# Patient Record
Sex: FEMALE | Race: WHITE
[De-identification: names, ages, dates, MRNs, and addresses within clinical notes are randomized per-mention and may not be internally consistent; named-entity substitution may affect disease eponyms.]

---

## 2017-01-12 ENCOUNTER — HOSPITAL ENCOUNTER (EMERGENCY)
Dept: HOSPITAL 68 - ERH | Age: 47
End: 2017-01-12
Payer: COMMERCIAL

## 2017-01-12 VITALS — SYSTOLIC BLOOD PRESSURE: 157 MMHG | DIASTOLIC BLOOD PRESSURE: 76 MMHG

## 2017-01-12 DIAGNOSIS — K57.90: Primary | ICD-10-CM

## 2017-01-12 DIAGNOSIS — N20.0: ICD-10-CM

## 2017-01-12 DIAGNOSIS — D18.00: ICD-10-CM

## 2017-01-12 DIAGNOSIS — N28.1: ICD-10-CM

## 2017-01-12 LAB
ABSOLUTE GRANULOCYTE CT: 8.2 /CUMM (ref 1.4–6.5)
BASOPHILS # BLD: 0 /CUMM (ref 0–0.2)
BASOPHILS NFR BLD: 0.2 % (ref 0–2)
EOSINOPHIL # BLD: 0.1 /CUMM (ref 0–0.7)
EOSINOPHIL NFR BLD: 0.9 % (ref 0–5)
ERYTHROCYTE [DISTWIDTH] IN BLOOD BY AUTOMATED COUNT: 14.5 % (ref 11.5–14.5)
GRANULOCYTES NFR BLD: 86.6 % (ref 42.2–75.2)
HCT VFR BLD CALC: 43.3 % (ref 37–47)
LYMPHOCYTES # BLD: 0.9 /CUMM (ref 1.2–3.4)
MCH RBC QN AUTO: 30 PG (ref 27–31)
MCHC RBC AUTO-ENTMCNC: 33.6 G/DL (ref 33–37)
MCV RBC AUTO: 89.2 FL (ref 81–99)
MONOCYTES # BLD: 0.3 /CUMM (ref 0.1–0.6)
PLATELET # BLD: 241 /CUMM (ref 130–400)
PMV BLD AUTO: 8.5 FL (ref 7.4–10.4)
RED BLOOD CELL CT: 4.85 /CUMM (ref 4.2–5.4)
WBC # BLD AUTO: 9.5 /CUMM (ref 4.8–10.8)

## 2017-01-12 NOTE — ED GI/GU/ABDOMINAL COMPLAINT
History of Present Illness
 
General
Chief Complaint: Abdominal Pain/Flank Pain
Stated Complaint: R LOWER ABD PAIN X 4 HOURS
Source: patient, old records
Exam Limitations: no limitations
 
Vital Signs & Intake/Output
Vital Signs & Intake/Output
 Vital Signs
 
 
Date Time Temp Pulse Resp B/P Pulse O2 O2 Flow FiO2
 
     Ox Delivery Rate 
 
 1630 98.8 88 18 157/76 96   
 
 1507     98 Room Air  
 
 1355 97.9 87 18 181/130 99 Room Air  
 
 
Allergies
Coded Allergies:
No Known Allergies (10/25/16)
 
Reconcile Medications
Dicyclomine Hydrochloride (Bentyl) 10 MG CAPSULE   1 CAP PO TID PRN PAIN
Ondansetron (Zofran Odt) 4 MG TAB.RAPDIS   1 TAB SL TID PRN NAUSEA
Oxycodone HCl/Acetaminophen (Percocet 5-325 MG Tablet) 5 MG-325 MG TABLET   1 
TAB PO TID PAIN
 
Triage Note:
C/O RIGHT SIDE ABDOMINAL PAIN X4 HOURS. +NAUSEA,
 +VOMITING X3.  INCREASED PAIN WITH WALKING.
Triage Nurses Notes Reviewed? yes
Pregnant? n
Is pt currently breastfeeding? No
Onset: Abrupt
Duration: hour(s): (4), constant
Timing: recent history
Quality/Severity: mild, moderate, sharpness
Severity Numbers: 6
Location: right lower quadrant
Radiation: no radiation
Activities at Onset: none
Prior Abdominal Problems: none
No Modifying Factors: none
Associated Symptoms: denies
HPI:
46-year-old female with history of hypertension cholecystectomy presents 
complaining of a 4 hour onset right lower quadrant abdominal pain that she awoke
him sleep with associated multiple episodes of nausea vomiting and diarrhea.  
She reports little sick contacts at home with similar symptoms however states 
that they did not have this abdominal pain.  The pain is worse with movement and
better at rest she attempted taking ibuprofen without improvement.  She denies 
any black or bloody stools no hematemesis no fever no chills no urinary 
complaints.  Her last menstrual cycle was a few weeks ago normal she denies any 
vaginal bleeding or discharge
 
Past History
 
Travel History
Traveled to [place] past 21 day No
 
Medical History
Any Pertinent Medical History? see below for history
Cardiovascular: hypertension
 
Surgical History
Surgical History: none
 
Psychosocial History
What is your primary language English
Tobacco Use: Never used
ETOH Use: occasional use
Illicit Drug Use: denies illicit drug use
 
Family History
Hx Contributory? No
 
Review of Systems
 
Review of Systems
Constitutional:
Reports: see HPI. 
All Other Systems: Reviewed and Negative
Comments
Review of systems: See HPI, All other systems negative.
Constitutional, no chills no fever, no malaise 
HEENT: no sore throat no congestion
Cardiovascular: No chest pain , no palpitation 
Skin, no jaundice no rashes, no change in skin
Respiratory: No dyspnea no cough no  sputum 
GI: No nausea no vomiting, no diarrhea, no bloating/constipation
: No dysuria No hematuria, no frequency, no discharge
Muscle skeletal: No joint pain, no back pain, no neck pain,
Neurologic: No numbness  no headache
Psych: No stress 
Heme/endocrine: No bruising no bleeding 
Immunology: No lymphadenopathy
 
Physical Exam
 
Physical Exam
General Appearance: well developed/nourished, alert, awake
Gastrointestinal: soft
Comments:
Well-developed well-nourished person in no acute distress
HEENT: Normal EENT exam; PERRL, EOMI,  HEAD is atraumatic. moist mucous 
membranes.  
Neck: Supple,  normal range of motion
Back: Nontender, no CVA tenderness. Full range of motion
Cardiovascular: Regular rate and rhythms no murmurs rubs 
Respiratory: No respiratory distress.  Patient speaking in full complete 
sentences. Breath sounds clear to auscultation bilaterally: NO W/R/R
Abdomen: Soft, tender to palpation of the right lower quadrant negative Rovsing'
s negative  sign nondistended, no appreciable organomegaly. Normal bowel
sounds. No rebound/guarding, 
Extremity: No edema, full range of motion of extremities,
Neuro: Alert oriented x3, motor sensory normal, There were no obvious focal 
neurologic abnormalities.
Skin: No appreciable rash on exposed skin, skin is warm and dry.
Psych: Mood and affect is normal, memory and judgment is normal.
 
Core Measures
ACS in differential dx? No
Severe Sepsis Present: No
Septic Shock Present: No
 
Progress
Differential Diagnosis: appendicitis, bowel obstruction, colon cancer, ectopic 
pregnancy, hepatitis, inflamm bowel dis, intrauterine pregnancy, kidney stone, 
ovarian cyst, ovarian torsion, pancreatitis, peptic ulcer, PUD/GERD, perforated 
viscous, SBO, threatened AB, UTI/pyelo
Plan of Care:
 Orders
 
 
Procedure Date/time Status
 
Add-on Test (ER Only)  1540 Active
 
HUMAN BETA HCG SCREEN  1433 Complete
 
URINALYSIS  1410 Complete
 
COMPREHENSIVE METABOLIC PANEL  1410 Complete
 
CBC WITHOUT DIFFERENTIAL  1410 Complete
 
 
 Current Medications
 
 
  Sig/[place]gett Start time  Last
 
Medication Dose  Stop Time Status Admin
 
Morphine Sulfate 2 MG ONCE ONE  1645 CAN 
 
(Morphine)    1646  
 
 
 Laboratory Tests
 
 
 
17 1519:
Urine Color YEL, Urine Clarity CLEAR, Urine pH 6.0, Ur Specific Gravity >= 1.030
, Urine Protein 30  H, Urine Ketones NEG, Urine Nitrite NEG, Urine Bilirubin NEG
, Urine Urobilinogen 0.2, Ur Leukocyte Esterase NEG, Ur Microscopic SEDIMENT 
EXAMINED, Urine RBC RARE, Urine WBC RARE, Ur Epithelial Cells MOD  H, Urine 
Mucus MOD  H, Urine Hemoglobin NEG, Urine Glucose NEG
 
17 1433:
Anion Gap 15, Estimated GFR > 60, BUN/Creatinine Ratio 28.6  H, Glucose 104  H, 
Calcium 9.4, Total Bilirubin 0.8, AST 20, ALT 35, Alkaline Phosphatase 100, 
Total Protein 8.1, Albumin 4.4, Globulin 3.7, Albumin/Globulin Ratio 1.2, Total 
Beta HCG NEGATIVE, CBC w Diff NO MAN DIFF REQ, RBC 4.85, MCV 89.2, MCH 30.0, RDW
14.5, MPV 8.5, Gran % 86.6  H, Lymphocytes % 9.0  L, Monocytes % 3.3, 
Eosinophils % 0.9, Basophils % 0.2, Absolute Granulocytes 8.2  H, Absolute 
Lymphocytes 0.9  L, Absolute Monocytes 0.3, Absolute Eosinophils 0.1, Absolute 
Basophils 0, PUBS MCHC 33.6
Labs ordered old records reviewed patient medicated Dilaudid 1 mg IV IV fluids 
CAT scan ordered discussed with her all of her lab results to date.
 
 
2017 5:03:00 PM discussed with the patient leave again all of her lab 
results CAT scan findings and incidental findings on ct.  The patient reports 
her pain is resolved with the 1 mg of Dilaudid IV fluids are running she is 
requesting something to drink.
 
Patient is tolerating by mouth challenge discussed the need for bland diet clear
liquids advance as tolerated.  Prescription for Bentyl Percocet and Zofran were 
provided advised close follow-up with her primary care tomorrow return anytime 
sooner if she feels worsening or symptoms or any other concerns.  I answer all 
of her questions.
I discussed with the patient at length all of their results, need for close 
follow up with their primary care physician.  This week.  I answered all of 
their questions, they feel comfortable with the plan and follow-up care. 
 
 
I discussed the medications that they will receive with the patient. I gave them
signs and symptoms that could indicate an adverse reaction. I have advised them 
to limit their activities until they can see how they respond to the medication.
 
(SARAH ARENAS,REN)
Diagnostic Imaging:
Viewed by Me: CT Scan.  Discussed w/RAD: CT Scan. 
Radiology Impression: PATIENT: ROMINA SLAUGHTER  MEDICAL RECORD NO: [id number] PRESENT
AGE: 46  PATIENT ACCOUNT NO: [id number] : 69  LOCATION: [facility] ORDERING 
PHYSICIAN: REN ARENAS     SERVICE DATE: 1128 EXAM TYPE: CAT - CT 
ABD & PELVIS W IV CONTRAST EXAMINATION: CT ABDOMEN AND PELVIS WITH CONTRAST 
CLINICAL INFORMATION: Right lower quadrant pain with nausea, vomiting and 
diarrhea.  Evaluate for appendicitis. COMPARISON: Abdominal ultrasound 2016 TECHNIQUE: Multidetector volumetric imaging was performed of the abdomen 
and pelvis before and after the IV administration of 95 mL of Optiray 320 
intravenous contrast. Coronal and sagittal reformatted images were provided. DLP
: 1634.67 mGy-cm. FINDINGS: LUNG BASES: The visualized lung bases are clear. 
LIVER, GALLBLADDER, AND BILIARY TREE: The liver is normal in size, shape, and 
attenuation. There is a 1.8 cm hypodensity within the left hepatic lobe (image 
23, series 2) which is indeterminate. It is grossly unchanged in size and 
compared to the prior ultrasound. No other hepatic lesions are identified. No 
biliary ductal dilatation is present. The patient is status post 
cholecystectomy. PANCREAS: Unremarkable. SPLEEN: Unremarkable. ADRENAL GLANDS: 
Unremarkable. KIDNEYS AND URETERS: The kidneys are normal in size, shape, and 
attenuation. A subcentimeter hypodensity is noted within the right upper pole, 
too small to characterize. There is a 6 mm nonobstructing calculus within the 
right upper pole (image 39, series 2). No ureteral calculi identified. A 1.2 cm 
hypoattenuating lesion is seen within the anterior left mid to lower pole. This 
does not demonstrate attenuation values of simple fluid. The lesion is 
indeterminate but may represent hyperdense cyst. Further evaluation can be 
obtained with ultrasound. There is a 4 mm nonobstructing calculus within the 
left upper pole (image 36, series 2). No ureteral calculi are identified. No 
hydronephrosis or hydroureter. No significant perinephric stranding. BLADDER: 
Underdistended. GASTROINTESTINAL TRACT: There is no bowel obstruction, free 
intraperitoneal air or fluid. What appears to be the appendix (image 58, series 
602) is within normal limits. No inflammatory changes are identified within the 
right lower quadrant. Nondilated fluid-filled loops of small bowel are noted 
throughout the abdomen without associated bowel wall thickening. There is 
descending and sigmoid colon diverticulosis without CT evidence of acute 
diverticulitis. ABDOMINAL WALL: No significant hernia is appreciated. LYMPH 
NODES: No mesenteric, retroperitoneal or pelvic lymphadenopathy. VASCULAR: 
Nonaneurysmal abdominal aorta. Patent portal and renal veins. PELVIC VISCERA: 
Uterus is retroverted. Probable small right adnexal cyst measuring 2.7 x 1.5 cm 
(image 82, series 2). OSSEOUS STRUCTURES: There are no aggressive osseous 
lesions. Mild degenerative changes are seen within the spine and sacroiliac 
joints. IMPRESSION: 1. No acute intra-abdominal or pelvic abnormality. 2. 
Nonobstructing bilateral renal calculi. 3. Diverticulosis without CT evidence of
acute diverticulitis. 4. Indeterminate hypodensity within the left hepatic lobe 
is grossly stable in size compared to the prior ultrasound. The ultrasound 
imaging suggests that this lesion may represent hemangioma. Confirmation could 
be obtained with dedicated hepatic mass protocol MRI. 5. Indeterminate left 
renal hypodensity, likely representing a cyst. Further evaluation can be 
obtained with renal ultrasound. 6. Other findings as described. DICTATED BY: 
LESVIA BUSTILLO DO  DATE/TIME DICTATED:17 :RAD.WATERS
 DATE/TIME TRANSCRIBED:17 CONFIDENTIAL, DO NOT COPY WITHOUT 
APPROPRIATE AUTHORIZATION.  <Electronically signed in Other Vendor System>      
                                                                                
SIGNED BY: LESVIA BUSTILLO DO 17 1653
Initial ED EKG: none
 
Departure
 
Departure
Time of Disposition: 1706
Disposition: HOME OR SELF CARE
Condition: Stable
Clinical Impression
Primary Impression: Abdominal pain
Secondary Impressions: Diverticulosis, Kidney stone, Liver hemangioma, Renal 
cyst
Referrals:
DESIRAE GRUBBS MD (PCP/Family)
 
Additional Instructions:
[place] diet clear liquids advance diet as tolerated.  Ondansetron for nausea.  
Bentyl for abdominal pain.  Percocet for breakthrough pain.  Use caution as this
is a narcotic and may make you drowsy no driving or drinking alcohol while 
taking.  Follow-up with her primary care physician tomorrow.  Return immediately
if he had worsening of your symptoms fever chills or any other concerns. These  
Prescriptions were sent to your pharmacy
Departure Forms:
Customer Survey
General Discharge Information
Prescriptions:
Current Visit Scripts
Oxycodone HCl/Acetaminophen (Percocet 5-325 MG Tablet) 1 TAB PO TID 
     #10 TAB 
 
Dicyclomine Hydrochloride (Bentyl) 1 CAP PO TID PRN PAIN
     #12 CAP 
 
Ondansetron (Zofran Odt) 1 TAB SL TID PRN NAUSEA
     #10 TAB 
 
 
 
ED Attending Observation
Initial Observation Note:
I have seen and personally examined ROMINA SLAUGHTER 
on 17 at 1714. 
 
I agree with the current emergency department documentation.  The disposition 
(admission or discharge) is uncertain at this time, she needs a period of 
observation for the following reason(s): 
 
The ED Nurse caring for this patient has been personally informed as to what the
patient is being observed for.

## 2017-01-12 NOTE — CT SCAN REPORT
EXAMINATION:
CT ABDOMEN AND PELVIS WITH CONTRAST
 
CLINICAL INFORMATION:
Right lower quadrant pain with nausea, vomiting and diarrhea.  Evaluate for
appendicitis.
 
COMPARISON:
Abdominal ultrasound 04/25/2016
 
TECHNIQUE:
Multidetector volumetric imaging was performed of the abdomen and pelvis
before and after the IV administration of 95 mL of Optiray 320 intravenous
contrast. Coronal and sagittal reformatted images were provided.
 
DLP:
1634.67 mGy-cm.
 
FINDINGS:
 
LUNG BASES: The visualized lung bases are clear.
 
LIVER, GALLBLADDER, AND BILIARY TREE: The liver is normal in size, shape, and
attenuation. There is a 1.8 cm hypodensity within the left hepatic lobe
(image 23, series 2) which is indeterminate. It is grossly unchanged in size
and compared to the prior ultrasound. No other hepatic lesions are
identified. No biliary ductal dilatation is present. The patient is status
post cholecystectomy.
 
PANCREAS: Unremarkable.
 
SPLEEN: Unremarkable.
 
ADRENAL GLANDS: Unremarkable.
 
KIDNEYS AND URETERS: The kidneys are normal in size, shape, and attenuation.
A subcentimeter hypodensity is noted within the right upper pole, too small
to characterize. There is a 6 mm nonobstructing calculus within the right
upper pole (image 39, series 2). No ureteral calculi identified. A 1.2 cm
hypoattenuating lesion is seen within the anterior left mid to lower pole.
This does not demonstrate attenuation values of simple fluid. The lesion is
indeterminate but may represent hyperdense cyst. Further evaluation can be
obtained with ultrasound. There is a 4 mm nonobstructing calculus within the
left upper pole (image 36, series 2). No ureteral calculi are identified. No
hydronephrosis or hydroureter. No significant perinephric stranding.
 
BLADDER: Underdistended.
 
GASTROINTESTINAL TRACT: There is no bowel obstruction, free intraperitoneal
air or fluid. What appears to be the appendix (image 58, series 602) is
within normal limits. No inflammatory changes are identified within the right
lower quadrant. Nondilated fluid-filled loops of small bowel are noted
throughout the abdomen without associated bowel wall thickening. There is
descending and sigmoid colon diverticulosis without CT evidence of acute
diverticulitis.
 
ABDOMINAL WALL: No significant hernia is appreciated.
 
LYMPH NODES: No mesenteric, retroperitoneal or pelvic lymphadenopathy.
 
VASCULAR: Nonaneurysmal abdominal aorta. Patent portal and renal veins.
 
PELVIC VISCERA: Uterus is retroverted. Probable small right adnexal cyst
measuring 2.7 x 1.5 cm (image 82, series 2).
 
OSSEOUS STRUCTURES: There are no aggressive osseous lesions. Mild
degenerative changes are seen within the spine and sacroiliac joints.
 
IMPRESSION:
1. No acute intra-abdominal or pelvic abnormality.
2. Nonobstructing bilateral renal calculi.
3. Diverticulosis without CT evidence of acute diverticulitis.
4. Indeterminate hypodensity within the left hepatic lobe is grossly stable
in size compared to the prior ultrasound. The ultrasound imaging suggests
that this lesion may represent hemangioma. Confirmation could be obtained
with dedicated hepatic mass protocol MRI.
5. Indeterminate left renal hypodensity, likely representing a cyst. Further
evaluation can be obtained with renal ultrasound.
6. Other findings as described.

## 2017-01-22 ENCOUNTER — HOSPITAL ENCOUNTER (EMERGENCY)
Dept: HOSPITAL 68 - ERH | Age: 47
End: 2017-01-22
Payer: COMMERCIAL

## 2017-01-22 VITALS — DIASTOLIC BLOOD PRESSURE: 57 MMHG | SYSTOLIC BLOOD PRESSURE: 116 MMHG

## 2017-01-22 VITALS — WEIGHT: 293 LBS | BODY MASS INDEX: 45.99 KG/M2 | HEIGHT: 67 IN

## 2017-01-22 DIAGNOSIS — R11.2: Primary | ICD-10-CM

## 2017-01-22 DIAGNOSIS — R10.30: ICD-10-CM

## 2017-01-22 LAB
ABSOLUTE GRANULOCYTE CT: 11.2 /CUMM (ref 1.4–6.5)
BASOPHILS # BLD: 0.1 /CUMM (ref 0–0.2)
BASOPHILS NFR BLD: 0.5 % (ref 0–2)
EOSINOPHIL # BLD: 0 /CUMM (ref 0–0.7)
EOSINOPHIL NFR BLD: 0.2 % (ref 0–5)
ERYTHROCYTE [DISTWIDTH] IN BLOOD BY AUTOMATED COUNT: 14.4 % (ref 11.5–14.5)
GRANULOCYTES NFR BLD: 83 % (ref 42.2–75.2)
HCT VFR BLD CALC: 42.6 % (ref 37–47)
LYMPHOCYTES # BLD: 1.7 /CUMM (ref 1.2–3.4)
MCH RBC QN AUTO: 30.1 PG (ref 27–31)
MCHC RBC AUTO-ENTMCNC: 34 G/DL (ref 33–37)
MCV RBC AUTO: 88.6 FL (ref 81–99)
MONOCYTES # BLD: 0.5 /CUMM (ref 0.1–0.6)
PLATELET # BLD: 284 /CUMM (ref 130–400)
PMV BLD AUTO: 8.7 FL (ref 7.4–10.4)
RED BLOOD CELL CT: 4.81 /CUMM (ref 4.2–5.4)
WBC # BLD AUTO: 13.5 /CUMM (ref 4.8–10.8)

## 2017-01-22 NOTE — ED GI/GU/ABDOMINAL COMPLAINT
History of Present Illness
 
General
Chief Complaint: Abdominal Pain/Flank Pain
Stated Complaint: PT C/O ABD PAIN. N/V/D SEEN HERE LAST WK FOR SAME
Source: patient, family, old records
Exam Limitations: no limitations
 
Vital Signs & Intake/Output
Vital Signs & Intake/Output
 Vital Signs
 
 
Date Time Temp Pulse Resp B/P Pulse O2 O2 Flow FiO2
 
     Ox Delivery Rate 
 
01/22 1112 97.6 79 20 116/57 99 Room Air  
 
01/22 0800 97.5 70 20 122/80 99 Room Air  
 
01/22 0611 97.2 66 18 181/91 100 Room Air  
 
 
Allergies
Coded Allergies:
No Known Allergies (10/25/16)
 
Reconcile Medications
Dicyclomine Hydrochloride (Bentyl) 10 MG CAPSULE   1 CAP PO TID PRN PAIN
Hydrochlorothiazide 25 MG TABLET   25 MG PO DAILY HIGH BLOOD PRESSURE  (Reported
)
Hyoscyamine Sulfate (Levsin-Sl) 0.125 MG TAB.SUBL   1-2 TAB SL Q4P PRN abdominal
pain
Ondansetron (Zofran Odt) 4 MG TAB.RAPDIS   1 TAB SL TID PRN NAUSEA
Ondansetron (Zofran Odt) 4 MG TAB.RAPDIS   1 TAB SL TID PRN nausea
Oxycodone HCl/Acetaminophen (Percocet 5-325 MG Tablet) 5 MG-325 MG TABLET   1 
TAB PO TID PAIN
Tramadol HCl (Ultram) 50 MG TABLET   1-2 TAB PO Q6PRN PRN severe pain
 
Triage Note:
PT C/O PERIUMBILICAL PAIN THAT RADIATES DOWN,
 N/V, DIZZINESS AND CHILLS SINCE WEDNESDAY. PT
 REPORTS BEING SEEN HERE FOR THE SAME MORE THEN A
 WEEK AGO. ABDOMEN SOFT AND TENDER UPON PALPATION.
Triage Nurses Notes Reviewed? yes
LMP (ages 10-50): unknown
Pregnant? n
Is pt currently breastfeeding? No
Onset: 2 days
Duration: day(s):, continues in ED, intermittent
Timing: recent history
Quality/Severity: aching, cramping, severe, vomiting
Location: suprapubic
Radiation: back
Activities at Onset: none
Prior Abdominal Problems: similar symptoms
Past Sexual History: Unobtainable at this time
Modifying Factors:
Improves With: rest.  Worsens With: eating, palpation. 
Associated Symptoms: abdominal pain, loss of appetite, nausea/vomiting
HPI:
2 days prior to admission patient was exposed to daughter with nausea vomiting 
diarrhea that she eventually developed accompanied by intermittent sharp 
moderate to severe suprapubic pain worse with eating palpation.
She denies fever chills chest pain cough shortness of breath headache dysuria 
rash bleeding.
 
Past History
 
Travel History
Traveled to [place] past 21 day No
 
Medical History
Any Pertinent Medical History? see below for history
Neurological: NONE
EENT: NONE
Cardiovascular: hypertension
Respiratory: NONE
Gastrointestinal: NONE
Hepatic: NONE
Renal: NONE
Musculoskeletal: NONE
Psychiatric: NONE
Endocrine: NONE
 
Surgical History
Surgical History: none
 
Psychosocial History
What is your primary language English
Tobacco Use: Never used
 
Family History
Hx Contributory? No
 
Review of Systems
 
Review of Systems
Constitutional:
Reports: see HPI, malaise. 
EENTM:
Reports: no symptoms. 
Respiratory:
Reports: no symptoms. 
Cardiovascular:
Reports: no symptoms. 
GI:
Reports: see HPI, abdominal pain, nausea, vomiting. 
Genitourinary:
Reports: no symptoms. 
Musculoskeletal:
Reports: no symptoms. 
Skin:
Reports: no symptoms. 
Neurological/Psychological:
Reports: no symptoms. 
Hematologic/Endocrine:
Reports: no symptoms. 
Immunologic/Allergic:
Reports: no symptoms. 
All Other Systems: Reviewed and Negative
 
Physical Exam
 
Physical Exam
General Appearance: well developed/nourished, alert, awake, anxious, moderate 
distress, obese
Head: atraumatic, normal appearance
Eyes:
Bilateral: normal appearance, PERRL, EOMI, normal inspection. 
Ears, Nose, Throat, Mouth: hearing grossly normal, dry mucous membranes
Neck: normal inspection, supple, full range of motion, normal alignment
Respiratory: normal breath sounds, chest non-tender, no respiratory distress, 
quiet respiration, lungs clear
Cardiovascular: regular rate/rhythm, normal peripheral pulses, norml femoral 
pulses equa
Peripheral Pulses:
4+ carotid (R), 4+ carotid (L)
Gastrointestinal: normal bowel sounds, soft, non-tender, no organomegaly
Back: normal inspection, normal range of motion
Extremities: normal range of motion, no ligament instability
Neurologic/Psych: no motor/sensory deficits, awake, alert, oriented x 3, normal 
gait, normal mood/affect, CNs II-XII nml as tested
Skin: intact, normal color, warm/dry
 
Core Measures
ACS in differential dx? No
Severe Sepsis Present: No
Septic Shock Present: No
 
Progress
Differential Diagnosis: appendicitis, biliary colic, diverticulitis, kidney 
stone, UTI/pyelo
Plan of Care:
 Orders
 
 
Procedure Date/time Status
 
URINE PREGNANCY 01/22 0841 Complete
 
URINALYSIS 01/22 0841 Complete
 
LIPASE 01/22 0841 Complete
 
COMPREHENSIVE METABOLIC PANEL 01/22 0841 Complete
 
CBC WITHOUT DIFFERENTIAL 01/22 0841 Complete
 
 
 Laboratory Tests
 
 
 
01/22/17 0903:
Urinalysis HEAVY  H, Urine Color YEL, Urine Clarity CLDY  H, Urine pH 5.5, Ur 
Specific Gravity >= 1.030, Urine Protein TRACE  H, Urine Ketones TRACE  H, Urine
Nitrite NEG, Urine Bilirubin NEG, Urine Urobilinogen 0.2, Ur Leukocyte Esterase 
NEG, Ur Microscopic SEDIMENT EXAMINED, Urine RBC RARE, Urine WBC RARE, Ur 
Epithelial Cells FEW, Urine Hemoglobin MOD  H, Urine Glucose NEG, Urine 
Pregnancy Test NEGATIVE
 
01/22/17 0852:
Anion Gap 13, Estimated GFR > 60, BUN/Creatinine Ratio 35.0  H, Glucose 150  H, 
Calcium 9.8, Total Bilirubin 0.8, AST 18, ALT 35, Alkaline Phosphatase 121, 
Total Protein 8.4  H, Albumin 4.6, Globulin 3.8, Albumin/Globulin Ratio 1.2, 
Lipase 101, CBC w Diff NO MAN DIFF REQ, RBC 4.81, MCV 88.6, MCH 30.1, RDW 14.4, 
MPV 8.7, Gran % 83.0  H, Lymphocytes % 12.3  L, Monocytes % 4.0, Eosinophils % 
0.2, Basophils % 0.5, Absolute Granulocytes 11.2  H, Absolute Lymphocytes 1.7, 
Absolute Monocytes 0.5, Absolute Eosinophils 0, Absolute Basophils 0.1, PUBS 
MCHC 34.0
Diagnostic Imaging:
Viewed by Me: Ultrasound.  Discussed w/RAD: Ultrasound. 
Radiology Impression: 1. Nonobstructing upper pole left renal calculus, similar 
to prior CT scan. 2. Mid right renal calcification seen on recent CT scan are 
not appreciated by ultrasound. 3. Mid left renal low-attenuation mass and upper 
pole right renal mass seen on CT scan are not appreciated on this ultrasound and
remain incompletely characterized. Depending on clinical circumstances, short 
interval (3-6 months) follow-up scan to document stability of these findings 
versus further characterization with dedicated renal MRI scan with and without 
contrast is recommended. 4. Duplicated left renal collecting system.
Initial ED EKG: none
 
Departure
 
Departure
Time of Disposition: 1314
Disposition: HOME OR SELF CARE
Condition: Stable
Clinical Impression
Primary Impression: Nausea and vomiting in adult
Secondary Impressions: 
Abdominal pain
Qualifiers:  Abdominal location: lower abdomen, unspecified Qualified Code: 
R10.30 - Lower abdominal pain, unspecified
 
Referrals:
HUMERA LOPEZ,DESIRAE (PCP/Family)
 
Additional Instructions:
Clear liquids in small amounts for 12-24 hours until better
Departure Forms:
Customer Survey
General Discharge Information
Prescriptions:
Current Visit Scripts
Ondansetron (Zofran Odt) 1 TAB SL TID PRN nausea
     #15 TAB 
 
Hyoscyamine Sulfate (Levsin-Sl) 1-2 TAB SL Q4P PRN abdominal pain
     #30 TAB 
 
Tramadol HCl (Ultram) 1-2 TAB PO Q6PRN PRN severe pain
     #30 TAB

## 2017-01-22 NOTE — ULTRASOUND REPORT
EXAMINATION:
US RETROPERITONEAL COMPLETE (RENAL)
 
CLINICAL INFORMATION:
Nausea and vomiting. Suprapubic and back pain. Presumptive diagnosis of renal
colic.
 
COMPARISON:
CT scan of the abdomen and pelvis dated 01/12/2017.
 
TECHNIQUE:
Real-time imaging of the kidneys and bladder.
 
FINDINGS:
 
RIGHT KIDNEY: 9.0 x 4.9 x 5.1 cm (SAG x AP x TRV). The kidney is normal in
size, contour, and echogenicity. The mid right renal calcification and the
upper pole right renal low-attenuation mass seen on recent CT scan is not
appreciated by ultrasound. Renal cortical thickness is normal. No focal
parenchymal lesions. No hydronephrosis.
 
LEFT KIDNEY: 12.6 x 5.5 x 5.3 cm (SAG x AP x TRV). The kidney is
asymmetrically larger compared to the right kidney given duplication of the
renal collecting system. Normal renal cortical echogenicity. Renal cortical
thickness is normal. As seen on the CT scan, there is a 0.5 x 0.3 x 0.5 cm cm
echogenic focus in the upper pole of the left kidney, consistent with a small
kidney stone. No focal parenchymal lesions. The mid left renal
low-attenuation mass seen on CT scan is not appreciated on this ultrasound.
No hydronephrosis.
 
BLADDER: Partially distended and unremarkable.
 
IMPRESSION:
 
1. Nonobstructing upper pole left renal calculus, similar to prior CT scan.
2. Mid right renal calcification seen on recent CT scan are not appreciated
by ultrasound.
3. Mid left renal low-attenuation mass and upper pole right renal mass seen
on CT scan are not appreciated on this ultrasound and remain incompletely
characterized. Depending on clinical circumstances, short interval (3-6
months) follow-up scan to document stability of these findings versus further
characterization with dedicated renal MRI scan with and without contrast is
recommended.
4. Duplicated left renal collecting system.

## 2017-02-22 ENCOUNTER — HOSPITAL ENCOUNTER (INPATIENT)
Dept: HOSPITAL 68 - 2NB | Age: 47
LOS: 2 days | DRG: 403 | End: 2017-02-24
Attending: SURGERY | Admitting: SURGERY
Payer: COMMERCIAL

## 2017-02-22 VITALS — DIASTOLIC BLOOD PRESSURE: 60 MMHG | SYSTOLIC BLOOD PRESSURE: 132 MMHG

## 2017-02-22 VITALS — HEIGHT: 67 IN | WEIGHT: 270 LBS | BODY MASS INDEX: 42.38 KG/M2

## 2017-02-22 VITALS — SYSTOLIC BLOOD PRESSURE: 132 MMHG | DIASTOLIC BLOOD PRESSURE: 60 MMHG

## 2017-02-22 VITALS — DIASTOLIC BLOOD PRESSURE: 64 MMHG | SYSTOLIC BLOOD PRESSURE: 160 MMHG

## 2017-02-22 VITALS — DIASTOLIC BLOOD PRESSURE: 80 MMHG | SYSTOLIC BLOOD PRESSURE: 170 MMHG

## 2017-02-22 DIAGNOSIS — E66.01: Primary | ICD-10-CM

## 2017-02-22 DIAGNOSIS — K44.9: ICD-10-CM

## 2017-02-22 DIAGNOSIS — I10: ICD-10-CM

## 2017-02-22 DIAGNOSIS — K21.9: ICD-10-CM

## 2017-02-22 PROCEDURE — 0BQS4ZZ: ICD-10-PCS | Performed by: SURGERY

## 2017-02-22 PROCEDURE — 0D164ZA BYPASS STOMACH TO JEJUNUM, PERCUTANEOUS ENDOSCOPIC APPROACH: ICD-10-PCS | Performed by: SURGERY

## 2017-02-22 PROCEDURE — 0BQR4ZZ: ICD-10-PCS | Performed by: SURGERY

## 2017-02-22 NOTE — ADMISSION CORE MEASURES
Admission Lab Results
I reviewed the following labs:
 Laboratory Tests
 
 
 02/22 0743
 
Chemistry 
 
  Total Beta HCG Pending
 
Urines 
 
  Urine Pregnancy Test Cancelled
 
 
 
 
Admission Meds
I reviewed the following Meds:
 Current Medications
 
 
  Sig/Bridgett Start time  Last
 
Medication Dose  Stop Time Status Admin
 
Cefazolin Sodium 3,000 MG ONCE 02/22 0000 NR 
 
(Kefzol-Ancef Inj)   02/22 2359  
 
Heparin Sodium  5,000 UNIT ONCE 02/22 0000 NR 
 
(Porcine)   02/22 2359  
 
 
 
 
Acute Coronary Syndrome
 
Inclusion Criteria
ACS Diagnosis No
 
Inpatient Core Measures
LDL Reminder: If No, please order W/I first 24hr of stay
 
Congestive Heart Failure
 
Inclusion Criteria
CHF Diagnosis No
 
Cerebrovascular accident
 
Inclusion Criteria
CVA/TIA Diagnosis No
 
Inpatient Core Measures
Bedside Swallow Eval Reminder: If BSE failed, place ST order
Antithrombotic Reminder: Order Antithrombotic Medication by end of day 2
Antithrombotic Reminder: Document Reason Antithrombotic Not ordered by end of 
day 2
AFIB/Flutter Reminder: If Present, add to problem list
AFIB/Flutter Reminder: Order Anticoag Medication for pts with AFIB/Flutter
Atherosclerosis Reminder: If Present, add to problem list
LDL Reminder: If No, please order W/I first 24hr of stay
PT Order Reminder: If No, please order
 
Venous thromboembolism
 
Inpatient Core Measures
VTE Risk Factors: Age > 40, Obesity, Surgery
VTE Prophylaxis Ordered Inpt Mech & Pharm
No Mech VTE prophylaxis d/t No contraindications
No VTE Pharm Prophylaxis d/t No contraindications
 
Inclusion Criteria
- Per Current guidelines, there needs to be overlap
- treatment for the first 5 days of Warfarin therapy.
- Parenteral Anticoagulation (IV or SC) needs to be
- given along with Warfarin therapy.
VTE Diagnosis No
VTE Type NONE
VTE Confirmed by (Test) NONE
 
Problem List
 
As ranked by this Provider
includes Assessment & Plan
 1. Gastric bypass status for obesity
 
 2. Morbid obesity
 
 3. GERD (gastroesophageal reflux disease)
 
 4. Hypertension
 
 
HOME MEDS
Home Med List
Hydrochlorothiazide 25 MG TABLET   25 MG PO DAILY HIGH BLOOD PRESSURE  (Reported
)
Pantoprazole Sodium (Protonix) 40 MG TABLET.DR   1 TAB PO DAILY GERD  (Reported)

## 2017-02-22 NOTE — PN- BARIATRICS
Subjective
Subjective:
Asking for ice chips. "I feel dry". No nausea. Not yet out of bed. Due to void 
by 1730. No dizziness. No shortness of breath. Reports incisional discomfort. 
 
Objective
Vital Signs and I&Os
Vital Signs
 
 
Date Time Temp Pulse Resp B/P Pulse O2 O2 Flow FiO2
 
     Ox Delivery Rate 
 
02/22 1427 97.7 73 16 132/60 96 Nasal 2.0L 
 
      Cannula  
 
 
 
Physical Exam:
General - alert & oriented x 3. comfortable. no acute distress.
Lungs - clear bilaterally. no w/r/r.
Cardiac - s1s2. reg.
Abdomen - dressings c/d/i. expected incisional tenderness. ALETA drain with scant 
bloody drainage.
Extremities - warm bilaterally. no c/c/e. calves soft and nontender b/l. 
athrombics b/l.
 
Assessment/Plan
Assessment/Plan
This 46 year old female with hx morbid obesity, htn, gerd, who is POD#0 s/p lap 
gastric bypass, hiatal hernia repair, JPx1
 
npo except ice chips until upper gi complete in morning
dilaudid / toradol / hycet prn pain control
hold hctz. monitor blood pressure
due to void later (1730)
barbra-operative ancefx2
protonix - gi ppx
hep sc - dvt ppx
lovenox teaching for home
oob/ambulation
f/u am labs
monitor ALETA drain
will d/w 
 
 
Core Measures/Miscellaneous
 
Venous Thromboembolism
VTE Risk Factors: Age > 40, Obesity, Surgery
VTE Contraindications: No Contraindications
VTE Prophylaxis Ordered Inpt: Mech & Pharm
VTE Diagnosis: No
VTE Type: NONE
VTE Confirmed by (Test): NONE
 
Beta Blocker
Is Beta Blocker a Home Med? No
 
Antibiotics
Is Patient on Antibiotics? Yes
If Yes: prophylaxis

## 2017-02-22 NOTE — NUR
NSG NOTE: PATIENT ARRIVED AT 1415 VIA BED ACCOMPANIED BY CASEY RN FROM PACU;
PATIENT ALERT/DROWSY/AROUSABLE AT THIS TIME; VSS (SEE INTERVENTION); 2L NC
(WILL TITRATE); IST PROVIDED BY THIS RN AND EDUCATED PATIENT; IVF RUNNING PER
ORDER; ALPS ON; PATIENT STATES PAIN 3/10; ORIENTED TO ROOM; CALL BELL IN
REACH; WILL CONT TO MONITOR

## 2017-02-22 NOTE — PATIENT DISCHARGE INSTRUCTIONS
Discharge Instructions
 
General Discharge Information
You were seen/treated for:
Morbid Obesity BMI 45, HTN, GERD, Hiatal Hernia
You had these procedures:
Laparoscopic Gastric Bypass, Laparoscopic Hiatal hernia repair
Watch for these problems:
fever>101.3, increased pain, redness/swelling/drainage
No bath, but you may shower: Yes
Other wound care:
dry guaze dressing change as needed over previous drain site. leave white steri 
strips in place.
Special Instructions:
continue lovenox injections daily, as directed
 
Diet
Continue normal diet: No
Recommended Diet: Bariatric
Additional DIET Information:
weekly bariatric stage diet advancement as tolerated, as directed
 
Activity
Full Activity/No Limits: No
Activity Self Limited: Yes
Pounds, do NOT lift more than: 10
Additional ACTIVITY Info:
no heavy lifting. no strenuous activity. walk frequently.
 
Acute Coronary Syndrome
 
Inclusion Criteria
At DC or during hospital stay patient has or had the following:
ACS DIAGNOSIS No
 
Discharge Core Measures
Meds if any: Prescribed or Continued at Discharge
Meds if any: NOT Prescribed or Continued at Discharge
 
Congestive Heart Failure
 
Inclusion Criteria
At DC or during hospital stay patient has or had the following:
CHF DIAGNOSIS No
 
Discharge Core Measures
Meds if any: Prescribed or Continued at Discharge
Meds if any: NOT Prescribed or Continued at Discharge
 
Cerebrovascular accident
 
Inclusion Criteria
At DC or during hospital stay patient has or had the following:
CVA/TIA Diagnosis No
 
Discharge Core Measures
Meds if any: Prescribed or Continued at Discharge
Meds if any: NOT Prescribed or Continued at Discharge
 
Venous thromboembolism
 
Inclusion Criteria
VTE Diagnosis No
VTE Type NONE
VTE Confirmed by (Test) NONE
 
Discharge Core Measures
- Per Current guidelines, there needs to be overlap
- treatment for the first 5 days of Warfarin therapy.
- If discharged on Warfarin prior to 5 days of
- overlap therapy, the patient will need to be
- assessed for post discharge needs including
- *Post discharge parental anticoagulation
- *Warfarin and/or parental anticoagulation education
- *Follow up date to check INR post discharge
At least 5 days overlap therapy as Inpatient No
Meds if any: Prescribed or Continued at Discharge
Note: Overlap Therapy is Warfarin and Anticoagulant
Meds if any: NOT Prescribed or Continued at Discharge

## 2017-02-22 NOTE — SURG SHORT-STAY <48HRS DIS SUM
Visit Information
 
Visit Dates
Admission Date:
02/22/17
 
Discharge Date:
2/24/17
 
 
Surgical Short Stay DC Summary
Admission Diagnosis:
Morbid Obesity BMI 45, HTN, GERD, Hiatal Hernia
Final Diagnosis:
same
 
Procedure(s):
Laparoscopic Gastric Bypass, Laparoscopic Hiatal hernia repair (2/22/17)
Summary/Significant Findings:
Electively schedule laparoscopic gastric bypass and hiatal hernia on 2/22/17. 
Routine post-operative course, including upper gi study on post-op day#1 morning
to r/o leak. Stage 1 bariatric diet initiated follow review of the upper gi 
study. ALETA drain monitored during hospitalization, and removed prior to her 
discharge to home. Her hydrochlorothiazide was held post-operatively and her 
blood pressure monitored. Lovenox teaching done for continued home prophylaxis (
prescription given pre-op). Discharge to home post-op day#2 after ALETA drain 
removed.
Condition at Discharge:
stable
Discharge Disposition: home or self care
Discharge instructions provided to patient/family: Yes
Post discharge follow-up plan:
call to schedule one week follow up visit if not previously scheduled
 
specific discharge instructions given to her at the time of her discharge to 
home

## 2017-02-22 NOTE — OPERATIVE REPORT
Operative/Inv Procedure Report
Surgery Date: 02/22/17
Name of Procedure:
Laparoscopic Gastric Bypass, Laparoscopic Hiatal hernia repair
Pre-Operative Diagnosis:
Morbid Obesity BMI 45, HTN, GERD, Hiatal Hernia
Post-Operative Diagnosis:
Same
Estimated Blood Loss: less than 50ml
Surgeon/Assistant:
MILVIA DOMINGUEZ DO
Anesthesia: general endotracheal tube
IV Fluids:
1800 cc
Drains:
10 Fr RUQ ALETA Drain
Specimens:
None
Complications:
none
Condition:
Stable
Operative Indication:
This is a 46-year-old female who presented to the office for workup for 
bariatric surgery.  After appropriate workup was completed we discussed with the
patient the band, the sleeve, and the gastric bypass.  The patient chose to 
undergo a gastric bypass.  All risks including but not limited to bleeding, 
infection, leak, stricture, injury to surrounding bowel/esophagus/stomach/liver/
spleen, marginal ulcer, malabsorption/malnutrition, dumping syndrome, internal 
hernia/small bowel obstruction, DVT/PE, and mortality of 1/1000 patients were 
discussed in detail.  The patient understood everything and decided to proceed.
 
Operative/Procedure Note
Note:
The patient was brought to the operating room and placed on the table in supine 
position.  Venodyne stockings were placed and adequate general endotracheal 
anesthesia was obtained.  The patient was prepped and draped in standard 
surgical fashion.  Began the procedure by making a 2 cm transverse incision 
supraumbilically and slightly to the left of the midline.  Then using a 12 mm 
clear Visiport and a 10 mm 0 laparoscope the abdominal cavity was accessed.  
Great care was taken to go through the anterior rectus sheath, the posterior 
rectus sheath, and through the peritoneum.  Once we entered the peritoneum the 
abdominal cavity was insufflated to 15 mmHg.  Upon initial examination no 
obvious gross pathology was seen.  Accessory trocars were placed, 5 mm in the 
epigastrium for the Sarah liver retractor.  The liver retractor was inserted
and the liver was retracted anteriorly exposing the hiatus, a hiatal hernia was 
seen.  5 mm ports were placed in the right and left upper quadrant, 5 mm left 
lateral port, and a 12 mm right lateral port.  Began the procedure by mobilizing
the angle of His and the left shira of the diaphragm.  The stomach was retracted 
towards the feet and the peritoneum was lysed until the left shira was clearly 
visualized.  A hernia sac was identified and was dissected away from the left 
shira exposing the hiatal hernia, there was a sliding component to it. We then 
brought our attention to the lesser curvature, and at the second vessel will 
began to accessing the retrogastric space.  Was done using Harmonic scalpel 
maintaining hemostasis.  Once the retrogastric space was accessed we began 
creating our pouch using 60 mm purple staple load 3 and 45 mm load x 1.  The 
pouch was created around an Franko tube.  Once the pouch was completely 
disconnected from the gastric remnant we examined the staple lines, some 
bleeding was noted and that was controlled using endoclips. Pars flaccida was 
then opened to the right shira and once again the hernia wac was identified and 
reduced. The esophagus was circumferentially dissected out of the chest until it
was in the abdominal cavity for 2-3 cm. The esophagus was retracted anteriorly 
and the hiatus was closed using 2-0 Ticron suture. At the completion it was 
adequately closed and there was ample room for the esophagus.  We then brought 
our attention to the small bowel, the omentum was retracted above the transverse
colon and the ligament of Treitz was identified.  The small bowel was run 50 cm 
and divided using 60 mm tan staple load.  2 clips were placed on the proximal 
staple line which was the biliopancreatic limb.  That limb was run to the 
ligament of Treitz to assure that that was the blind limb.  Following this the 
omentum was divided using Harmonic scalpel.  After the omentum was divided the 
small bowel was coming up to the gastric pouch with no tension.  At that point 
an enterotomy was made 5 cm from the prior divided distal staple line, snf 
between the mesenteric and antimesenteric part.  A gastrotomy was made anterior 
to the staple line.  A side to side gastrojejunostomy was created using 45 mm 
purple staple load approximately 3-3-1/2 cm in diameter.  The Franko tube was 
passed through the common enterotomy, and the common enterotomy was closed using
2-0 Vicryl suture in a running fashion double layer.  Following this the small 
bowel was clamped off distal to the anastomosis, and we preformed an air and a 
methylene blue leak test.  No obvious leak was noted.  All the methylene blue 
was suctioned out.  The small bowel was then run 120 cm and an enterotomy was 
made on the antimesenteric side.  Another enterotomy was made on the 
antimesenteric side of the biliopancreatic limb.  A side to side functional end 
end jejunojejunostomy was created using 60 mm tan staple load.  The common 
enterotomy was closed using a 60 mm tan staple load.  Some bleeding was noted 
from the staple line and that was controlled using endoclips as well.  The 
mesenteric defect was closed using 2-0 Tycron suture in a running fashion.  At 
that point we examined both anastomoses no obvious bleeding was noted and both 
appeared intact.  2-0 Vicryl was placed at the distal angle of the 
gastrojejunostomy to take some tension off the anastomosis.  A 10 Sudanese ALETA 
drain was placed through the right upper quadrant incision under the liver and 
over the spleen.  All ports were removed under direct visualization, no obvious 
bleeding was noted.  Skin was closed using 4-0 Monocryl.  Steri-Strips and 
dressings were placed.  The patient was successfully extubated and transferred 
to the recovery room in stable condition.  The patient tolerated the procedure 
well with no complications.
Findings:
3 cm sliding hiatal hernia, 120 cm alimentary limb
CC:
HUMERA LOPEZ,DESIRAE

## 2017-02-23 VITALS — DIASTOLIC BLOOD PRESSURE: 64 MMHG | SYSTOLIC BLOOD PRESSURE: 120 MMHG

## 2017-02-23 VITALS — SYSTOLIC BLOOD PRESSURE: 162 MMHG | DIASTOLIC BLOOD PRESSURE: 90 MMHG

## 2017-02-23 VITALS — DIASTOLIC BLOOD PRESSURE: 62 MMHG | SYSTOLIC BLOOD PRESSURE: 120 MMHG

## 2017-02-23 LAB
ABSOLUTE GRANULOCYTE CT: 7.6 /CUMM (ref 1.4–6.5)
BASOPHILS # BLD: 0 /CUMM (ref 0–0.2)
BASOPHILS NFR BLD: 0.1 % (ref 0–2)
EOSINOPHIL # BLD: 0 /CUMM (ref 0–0.7)
EOSINOPHIL NFR BLD: 0.1 % (ref 0–5)
ERYTHROCYTE [DISTWIDTH] IN BLOOD BY AUTOMATED COUNT: 13.6 % (ref 11.5–14.5)
GRANULOCYTES NFR BLD: 82.5 % (ref 42.2–75.2)
HCT VFR BLD CALC: 38 % (ref 37–47)
LYMPHOCYTES # BLD: 1 /CUMM (ref 1.2–3.4)
MCH RBC QN AUTO: 29.9 PG (ref 27–31)
MCHC RBC AUTO-ENTMCNC: 33.5 G/DL (ref 33–37)
MCV RBC AUTO: 89 FL (ref 81–99)
MONOCYTES # BLD: 0.6 /CUMM (ref 0.1–0.6)
PLATELET # BLD: 232 /CUMM (ref 130–400)
PMV BLD AUTO: 9.4 FL (ref 7.4–10.4)
RED BLOOD CELL CT: 4.27 /CUMM (ref 4.2–5.4)
WBC # BLD AUTO: 9.2 /CUMM (ref 4.8–10.8)

## 2017-02-23 NOTE — PN- STUDENT
CURT PARKER 02/23/17 0643:
Subjective
Subjective:
Patient states she did not have a good night. She reports overnight she 
experienced gas like pain across her lower abdomen in a band like distribution 
that at its worse is a 9/10. Patient reports she has a low pain tolerance. She 
further reported having an issue with the IV line and once it was switched she 
felt the abdominal pain was improved. She reports one episode of vomiting which 
she said had some blood in it that total might have been about the size of a 
quarter. She states her pain is well controlled at a 1-2 until just before the 4
hour jorge at which point it increases up to a 9. She states she does not like 
the liquid Vicodin so she would prefer to have pain until she is due for 
Dilauded. Patient also reports she has nausea, but the zofran is relieving it 
for most of the time. She denies any chest pain, shortness of breath, fever/
chills, headache, bowel movements, dysuria, knee or leg pain.    
 
Objective
Objective:
Vital signs:
23:50
BP: 160/64
Pulse: 69
Resp :18
Temp: 98.7F
SpO2: 96
 
 
Physical Exam:
General: well appearing, in no acute distress. alert and oriented to person 
place and time. 
Head: normocephalic, atraumatic
Eyes: PERRL, EOMI
Cardio: Regular rate and rhythm, clear S1 and S2. No murmurs, rubs or gallops. 
No JVD.
Pulmonary: Cleart to asucultation bilaterally. No rhonchi, wheeze, or rales. 
Abd: Tympanic to percussion, hypoactive bowel sounds. Tender to palpation at 
epigastric area and near incision sites. No ecchymosis, erythema or discharge. 
ALETA drain with minimal serosanguinious fluid. 
Extremities: Full range of motion, non-tender, no edema. 
 
  
 
Results
Results:
Laboratory Tests
 
02/22/17 0743:
Total Beta HCG NEGATIVE, Urine Pregnancy Test Cancelled
 
 
Assessment/Plan
Assessment:
Pt is a 46 year old female with a history of GERD, hypertension, morbid obeisity
, and hiatal hernia who is POD # 1 for a laproscopic gastric bypass and hiatal 
hernia repair with out a complicated hospital course.  
Plan:
-Continue NPO until GI study is complete.
-Pending adequate GI study, initiate step 1 diet. 
-Continue IV pain meds
-Initiate IV toradol for breakthrough pain. 
-Continue Zofran for nausea. 
-Encourage out of bed ambulation. 
-Initiate DVT ppx with lovenox and appropriate training.
-Follow up pending am labs. 
 
Will discuss the above with PA surgical team. 
 
Curt ARENAS-S2
 
 
 
ALEX ARENAS,DYLON 02/23/17 0713:
Objective
Objective:
Abdomen: Softly distended, obese, appropriate incisional tenderness, bowel 
sounds positive.  Port sites are clean, dry, and intact.  There is a ALETA 1 
holding suction with serous sinus drainage in the bulb.
Extremities: Bilateral lower extremities are warm without calf tenderness or 
significant edema.
 
Assessment/Plan
Plan:
Assessment: 46-year-old female status post laparoscopic gastric bypass with 
hiatal hernia repair postoperative day #1.  The patient is progressing as 
expected and her nausea is improving.
 
Plan as above, we'll add Toradol when necessary and encouraged patient to 
ambulate.  Advance to a stage I diet if upper GI series is okay.

## 2017-02-23 NOTE — RADIOLOGY REPORT
EXAMINATION:
FLUOROSCOPY UPPER GI WITH GASTROGRAFIN
 
CLINICAL INFORMATION:
1 day status post gastric bypass study. Postoperative evaluation.
 
COMPARISON:
The abdomen and pelvis dated 01/12/2017.
 
TECHNIQUE:
 
A limited Gastrografin upper GI study was performed using 30 ml of Gastroview
with the patient in the upright and supine position. Multiple spot films were
acquired.
 
FINDINGS:
The preliminary  view of the abdomen demonstrates a drain and
postsurgical staples in the epigastric region. Mild gaseous distention of
bowel loops is seen.
 
Evaluation of esophageal distensibility and motility is limited, but grossly
unremarkable. The GE junction is located below the level of the diaphragm and
no GE reflux seen.
 
The remnant gastric pouch is normal with no abnormal distention or contrast
leak seen.  There is prompt emptying of contrast into the anastamosed small
bowel, which is unremarkable in appearance.
 
FLUOROSCOPY TIME:
22 seconds.
 
IMPRESSION:
Unremarkable postoperative examination status post gastric bypass surgery. No
evidence of contrast leak seen.

## 2017-02-24 VITALS — SYSTOLIC BLOOD PRESSURE: 122 MMHG | DIASTOLIC BLOOD PRESSURE: 80 MMHG

## 2017-02-24 NOTE — NUR
LATE ENTRY: PT HAD A SLIGHTLY BLOODY BM. PT'S VSS. LABS WNL. PT IN NO
DISTRESS. ASYMPTOMATIC. SURGICAL PA NIKA MARK NOTIFIED. TO CONT TO MONITOR.
STILL TO BE DISCHARGED. PT EDUCATED. WILL CONT TO MONITOR.

## 2017-02-24 NOTE — PN- STUDENT
CASTRO SENIOR 02/24/17 0634:
Subjective
Subjective:
Patient reports no events overnight but states she had severe pain around 3:30am
which was relieved with pain medication at 3:45am. She is concerned that there 
is blood in her urine but states she is on day 6 of her menses. She reports 
having belching but otherwise no gas. She denies any chest pain, shortness of 
breath, nausea, vomiting, flatus, bowel movements, fever/chills, headaches, or 
pain in her calf areas. 
 
Objective
Objective:
Vitals:
BP: 120-64
Pulse: 66
Resp: 20
SpO2: 95 RA
Temp: 98.2F
 
Physical Exam:
General: Pt well appearing resting comfortably. Alert and oriented to person 
place and time.
Head: Normocephalic, atraumatic.
Eyes: PERRL, EOMI.
Mouth: Moist pink mucous membranes.
Cardiac: Regular rate and rhythm, clear S1 and S2. No murmurs rubs or gallops. 
Pulmonary: Clear to auscultation bilaterally, no wheezes, rhonchi, or rales. 
Abdomen: Non-distended, tender to palpation and percussion at the epigastric 
area. Incisions still dressed with no obvious drainage. No ecchymosis present. 
No masses palpated. ALETA drain mostly full with serosanguinous fluid. 
Extremities:  Bilateral lower extremities full range of motion, no calf 
tenderness, no edema, and 2+ dorsalis pedis pulses. 
 
 
 
Results
Results:
Laboratory Tests
 
02/23/17 0620:
Anion Gap 10, Estimated GFR > 60, BUN/Creatinine Ratio 18.3, Magnesium 2.0, CBC 
w Diff NO MAN DIFF REQ, RBC 4.27, MCV 89.0, MCH 29.9, RDW 13.6, MPV 9.4, Gran % 
82.5  H, Lymphocytes % 11.1  L, Monocytes % 6.2, Eosinophils % 0.1, Basophils % 
0.1, Absolute Granulocytes 7.6  H, Absolute Lymphocytes 1.0  L, Absolute 
Monocytes 0.6, Absolute Eosinophils 0, Absolute Basophils 0, PUBS MCHC 33.5
 
02/22/17 0743:
Total Beta HCG NEGATIVE, Urine Pregnancy Test Cancelled
 
 
Assessment/Plan
Assessment:
Pt is a 46 year old female with a history of morbid obesity, hiatal hernia, GERD
, and hypertension who is POD#2 for a laproscopic hiatal hernia repair and 
gastric bypass without a complicated hospital course. 
Plan:
-Continue stage 1 diet
-Continue toradol for moderate pain prn
-Continue dilauded for severe pain prn
-Continue enoxaparin for dvt prophylaxis 
-Encourage out of bed ambulation
-Follow up pending am labs
 
Will discuss above with PA surgical team. 
 
Castro Senior PA-S2
 
OLEG MERIDA 02/24/17 0705:
Assessment/Plan
Plan:
AGREE WITH ABOVE PA-S NOTE
ALETA DRAIN REMOVED
D/C IV DILAUDID. TRY DILAUDID PILLS (SHE DID NOT TOLERATE HYCET WELL)
LOVENOX TEACHING DONE
D/C HOME TODAY
WILL D/W

## 2017-03-28 ENCOUNTER — HOSPITAL ENCOUNTER (EMERGENCY)
Dept: HOSPITAL 68 - ERH | Age: 47
End: 2017-03-28
Payer: COMMERCIAL

## 2017-03-28 VITALS — BODY MASS INDEX: 38.65 KG/M2 | WEIGHT: 255 LBS | HEIGHT: 68 IN

## 2017-03-28 VITALS — DIASTOLIC BLOOD PRESSURE: 80 MMHG | SYSTOLIC BLOOD PRESSURE: 150 MMHG

## 2017-03-28 DIAGNOSIS — R10.84: Primary | ICD-10-CM

## 2017-03-28 LAB
ABSOLUTE GRANULOCYTE CT: 8.1 /CUMM (ref 1.4–6.5)
BASOPHILS # BLD: 0 /CUMM (ref 0–0.2)
BASOPHILS NFR BLD: 0.4 % (ref 0–2)
EOSINOPHIL # BLD: 0 /CUMM (ref 0–0.7)
EOSINOPHIL NFR BLD: 0.1 % (ref 0–5)
ERYTHROCYTE [DISTWIDTH] IN BLOOD BY AUTOMATED COUNT: 14.6 % (ref 11.5–14.5)
GRANULOCYTES NFR BLD: 83.4 % (ref 42.2–75.2)
HCT VFR BLD CALC: 43.9 % (ref 37–47)
LYMPHOCYTES # BLD: 1.2 /CUMM (ref 1.2–3.4)
MCH RBC QN AUTO: 29.8 PG (ref 27–31)
MCHC RBC AUTO-ENTMCNC: 33.7 G/DL (ref 33–37)
MCV RBC AUTO: 88.6 FL (ref 81–99)
MONOCYTES # BLD: 0.3 /CUMM (ref 0.1–0.6)
PLATELET # BLD: 254 /CUMM (ref 130–400)
PMV BLD AUTO: 10 FL (ref 7.4–10.4)
PROTHROMBIN TIME: 12.3 SEC (ref 9.4–12.5)
RED BLOOD CELL CT: 4.96 /CUMM (ref 4.2–5.4)
WBC # BLD AUTO: 9.7 /CUMM (ref 4.8–10.8)

## 2017-03-28 NOTE — ED GI/GU/ABDOMINAL COMPLAINT
History of Present Illness
 
General
Chief Complaint: Nausea, Vomiting, Diarrhea
Stated Complaint: N/V ABD PAIN S/P GASTRIC BYPASS 5 WEEKS AGO
Source: patient, family, old records
Exam Limitations: no limitations
 
Vital Signs & Intake/Output
Vital Signs & Intake/Output
 Vital Signs
 
 
Date Time Temp Pulse Resp B/P Pulse O2 O2 Flow FiO2
 
     Ox Delivery Rate 
 
1914 98.7 77 18 150/80 100 Room Air  
 
 1747 97.5 66 16 150/76 100 Room Air  
 
 1555  62 16 150/84 99 Room Air  
 
 1525 97.4 63 16 190/74 99 Room Air  
 
 1346 98.0 76 20  99 Room Air  
 
 
Allergies
Coded Allergies:
No Known Allergies (10/25/16)
 
Reconcile Medications
Hydrochlorothiazide 25 MG TABLET   25 MG PO DAILY HIGH BLOOD PRESSURE  (Reported
)
     NOT GIVEN
Hydromorphone HCl (Dilaudid) 2 MG TABLET   1 TAB PO BIDP PRN PAIN
Multivitamin (Multi-Day Vitamins) 1 EACH TABLET   1 TAB PO DAILY SUPPLEMENT-
BARIATRIC  (Reported)
Ondansetron (Zofran Odt) 4 MG TAB.RAPDIS   1 TAB SL TID PRN NAUSEA
Pantoprazole Sodium (Protonix) 40 MG TABLET.DR   1 TAB PO DAILY GERD  (Reported)
 
Triage Note:
PT PRESENTS TO ER C/O OF ABDOMINAL PAIN AND N/V X3
DAYS. PT STATES SHE IS 5 WEEKS POST GASTRIC
BYPASS. PT STATES SHE ALSO HAS MIDSTERNAL CHEST
PAIN AND FEELS SOB. PT VERY ANXIOUS AT TRIAGE AND
TEARFUL
Triage Nurses Notes Reviewed? yes
Pregnant? n
Is pt currently breastfeeding? No
Onset: Gradual
Duration: day(s): (3), constant
Timing: recent history
Quality/Severity: aching, cramping, moderate, sharpness
Severity Numbers: 7
Location: epigastric, generalized abdomen
Radiation: epigastric
Activities at Onset: none
Prior Abdominal Problems: none
No Modifying Factors: none
Associated Symptoms: denies
HPI:
47-year-old female presents emergency room for further evaluation complaining of
generalized crampy aching throbbing abdominal pain nonradiating for the past 3 
days associated with multiple episodes of nausea and vomiting.  Patient states 
that this morning she was feeling anxious and felt short of breath and was 
complaining of chest pain.  She states the chest pain has since resolved however
she intermittently feels short of breath still.  No cough fever chills 
hemoptysis.  She is not taken anything for her symptoms.  Patient is 5 weeks 
status post gastric bypass by Dr. Crespo.  She denies any change in her bowel 
movements are last bowel movement was this morning and normal no black or bloody
stools
 
(REN LEE)
 
Past History
 
Travel History
Traveled to [place] past 21 day No
 
Medical History
Any Pertinent Medical History? see below for history
Neurological: NONE
EENT: NONE
Cardiovascular: hypertension
Respiratory: NONE
Gastrointestinal: POST OP N/V
Hepatic: NONE
Renal: NONE
Musculoskeletal: NONE
Psychiatric: NONE
Endocrine: NONE
Cancer(s): NONE
History of MRSA: No
History of VRE: No
History of CDIFF: No
Influenza Vaccine: 16
 
Surgical History
Surgical History: cholecystectomy, GASTRIC BYPASS
 
Psychosocial History
Who do you live with Daughter
Services at Home None
What is your primary language English
Tobacco Use: Never used
 
Family History
Hx Contributory? No
(REN LEE)
 
Review of Systems
 
Review of Systems
Constitutional:
Reports: see HPI. 
All Other Systems: Reviewed and Negative
Comments
Review of systems: See HPI, All other systems negative.
Constitutional, no chills no fever, no malaise
HEENT:no sore throat no congestion, no ear pain
Cardiovascular: No chest pain , no palpitation 
Skin, no jaundice no rashes, no change in skin
Respiratory: dyspnea no cough no  sputum 
GI: nausea no vomiting, no diarrhea,
: No dysuria 
Muscle skeletal: No joint pain, no back pain, no neck pain,
Neurologic: No numbness no headache
Psych: No stress no anxiety no  depression,.
Heme/endocrine: No bruising no bleeding no polyuria no polydipsia
Immunology: No lymphadenopathy, no splenectomy
(REN LEE)
 
Physical Exam
 
Physical Exam
General Appearance: well developed/nourished, no apparent distress, alert, awake
Gastrointestinal: soft, epigastric pain
Comments:
Well-developed well-nourished person in no acute distress
HEENT: Normal EENT exam; PERRL, EOMI, HEAD is atraumatic. moist mucous 
membranes.  
Neck: Supple, normal range of motion 
Back: Nontender, no CVA tenderness. Full range of motion
Cardiovascular: Regular rate and rhythms no murmurs rubs
Respiratory: Chest nontender.There were no bony deformities, no asymmetry. No 
respiratory distress.  Patient speaking in full complete sentences. Breath 
sounds clear to auscultation bilaterally: NO W/R/R
Abdomen: Soft, epigastric tenderness to palpation negative Moody sign 
nondistended, no appreciable organomegaly. Normal bowel sounds. No rebound/
guarding, No appreciable enlargement of the abdominal aorta, No ascites.
Extremity: No edema, full range of motion of extremities
Neuro: Alert oriented x3, motor sensory normal, There were no obvious focal 
neurologic abnormalities.
Skin: No appreciable rash on exposed skin, skin is warm and dry.
Psych: Mood and affect is normal, memory and judgment is normal.
 
Core Measures
ACS in differential dx? Yes
Severe Sepsis Present: No
Septic Shock Present: No
(SARAH ARENAS,REN)
 
Progress
Differential Diagnosis: biliary colic, bowel obstruction, colon cancer, 
diverticulitis, gastritis, hepatitis, ischemic bowel, inflamm bowel dis, 
pancreatitis, PUD/GERD, SBO, pe, ami, pericarditis, influenza, electrolyte 
abnormaity
Plan of Care:
 Orders
 
 
Procedure Date/time Status
 
Telemetry/Cardiac Monitor  1508 Active
 
Add-on Test (ER Only)  1458 Active
 
TROPONIN LEVEL  1441 Complete
 
PROTHROMBIN TIME  1441 Complete
 
LIPASE  1441 Complete
 
HUMAN BETA HCG SCREEN  1441 Complete
 
COMPREHENSIVE METABOLIC PANEL  1441 Complete
 
CBC WITHOUT DIFFERENTIAL  1441 Complete
 
AMYLASE  1441 Complete
 
EKG  1349 Active
 
 
 Current Medications
 
 
  Sig/Bridgett Start time  Last
 
Medication Dose  Stop Time Status Admin
 
Promethazine HCl 50 MG ONCE ONE  1600 CAN 
 
(Phenergen)    1601  
 
 
 Laboratory Tests
 
 
 
17 1459:
Anion Gap 22  H, Estimated GFR > 60, BUN/Creatinine Ratio 21.7, Glucose 121  H, 
Calcium 9.8, Total Bilirubin 0.6, AST 19, ALT 39, Alkaline Phosphatase 91, 
Troponin I < 0.01, Total Protein 8.1, Albumin 4.4, Globulin 3.7, Albumin/
Globulin Ratio 1.2, Amylase 57, Lipase 198, Total Beta HCG NEGATIVE, PT 12.3, 
INR 1.17, CBC w Diff NO MAN DIFF REQ, RBC 4.96, MCV 88.6, MCH 29.8, RDW 14.6  H,
MPV 10.0, Gran % 83.4  H, Lymphocytes % 12.5  L, Monocytes % 3.6, Eosinophils % 
0.1, Basophils % 0.4, Absolute Granulocytes 8.1  H, Absolute Lymphocytes 1.2, 
Absolute Monocytes 0.3, Absolute Eosinophils 0, Absolute Basophils 0, PUBS MCHC 
33.7
 
Labs ordered old records reviewed patient acute Dilaudid 1 mg IV Zofran 4 IV CAT
scans ordered case was discussed with Dr. Nick
 
 
 
Repeat evaluation patient is resting comfortably however states that her nausea 
persists pain has improved.
 
 
I discussed with patient at length all of her lab results pending CAT scan
 
 
Repeat evaluation patient reports nausea has persisted the Dilaudid had 
previously helped with her nausea Phenergan was no improvement
 
3/28/2017 6:51:24 PM I discussed with the patient at length as well as her 
family her CAT scan findings need for close follow-up with her bariatric 
surgeon.  Prescriptions provided the patient feels comfortable plan she is 
tolerating by mouth here.  Patient attributed her shortness of breath chest pain
earlier to her anxiety she has not been hypoxic nor tachycardic here clinically 
appears well there is no pain with inspiration
 
 
 
PATIENT: ROMINA SLAUGHTER  MEDICAL RECORD NO: [id number]
PRESENT AGE: 47  PATIENT ACCOUNT NO: [id number]
: 68  LOCATION: [facility]
ORDERING PHYSICIAN: REN ARENAS  
 
  SERVICE DATE: 3365
EXAM TYPE: CAT - CT ABD & PELVIS W ORAL & IV CO; CTA CHEST-PULMONARY EMBOLISM
 
EXAMINATION:
CT ANGIOGRAM OF THE CHEST WITH AND WITHOUT CONTRAST (CT PULMONARY ANGIOGRAM
FOR PE)
CT ABDOMEN AND PELVIS WITH CONTRAST
 
CLINICAL INFORMATION:
Dyspnea. Recent gastric bypass surgery.
 
COMPARISON:
CT abdomen and pelvis dated 2017
 
TECHNIQUE:
Prior to contrast administration, noncontrast localization images were
obtained. Subsequently, multidetector volumetric imaging was performed from
the thoracic inlet to below the diaphragms following the administration of 95
mL Optiray 300 intravenous contrast.
No contrast reaction reported.
Sagittal, coronal, and MIP oblique sagittal reformatted images were obtained
on the CT workstation, uploaded to PACS, and reviewed.
Total exam dose-length product 1684.02 mGy-cm.
 
FINDINGS:
 
QUALITY OF STUDY/CONTRAST BOLUS: Suboptimal
 
PULMONARY ARTERIES: Suboptimal enhancement of the pulmonary arteries with
greater contrast in the pulmonary veins. This limits the evaluation of the
lobar and segmental pulmonary arteries. No gross evidence of pulmonary
embolism in the main pulmonary artery.
 
THORACIC AORTA: No aneurysm or dissection.
 
LUNG: Subpleural nodular opacity measuring approximately 0.9 cm anterior
aspect right middle lobe (coronal image 21 and series 2 image 276). Small
subpleural nodules also noted in the right lower lobe measuring approximately
0.6 cm (series 2 image 271). Minor subsegmental atelectasis noted in the
right lower lobe adjacent to the major fissure (series 2 image 277).
 
PLEURA: No pleural effusion or pneumothorax.
 
MEDIASTINUM: Normal heart size. No pericardial effusion. No hilar or
mediastinal lymphadenopathy. No evidence of septal bowing or right heart
strain.
 
CHEST WALL/AXILLA: No axillary or internal mammary lymphadenopathy.
 
LIVER, GALLBLADDER AND BILIARY TREE: Low-attenuation hepatic foci are noted
in the left hepatic lobe. Subcapsular hypodensity along the falciform
ligament segment 4A left hepatic lobe measuring 2 x 1.8 cm (series 5 image
24) may represent transient hepatic attenuation difference. Additional
low-attenuation lesion segment 2 left hepatic lobe (series 5 image 22)
measuring 1.6 x 1.5 cm. Hounsfield units are compatible with a solid lesion.
This has remained stable over the interval.
No additional lesions identified. Status post cholecystectomy. Stable minor
prominence of the common bile duct measuring approximately 0.7 cm compatible
with postcholecystectomy change.
 
Spleen: Unremarkable.
 
Pancreas: Unremarkable.
 
Adrenal glands: Unremarkable.
 
Kidneys: Stable nonobstructing calculus upper pole right kidney measuring
approximately 0.6 cm. Previously seen left upper pole calculus is no longer
visualized.
Duplicated left renal collecting system.
Small cortical scar upper pole right kidney noted again.
Stable low-attenuation cortical lesion mid left kidney measuring 1.1 cm.
Hounsfield units do not reflect simple cyst. Differential possibility
includes complex proteinaceous or hemorrhagic cyst. Since the lesion was not
visible on ultrasound, further assessment with nonemergent renal MRI may be
of value.
 
GI tract: Postoperative gastric bypass changes. Small amount of orally
administered contrast is noted in the mid to distal esophagus, gastric pouch
as well as the jejunal loops. No gross evidence of extravasation.
No evidence of obstruction. Colonic diverticulosis. No evidence of acute
diverticulitis.
 
Lymphovascular structures: Unremarkable.
 
Pelvis: Dominant cysts bilateral ovaries/adnexa compatible with physiologic
changes in this age group. No abnormal fluid collection or abscess formation.
 
OSSEOUS STRUCTURES: Degenerative changes. No acute osseous abnormality.
 
 
IMPRESSION:
 
1. Suboptimal enhancement of the pulmonary arteries limiting their evaluation
of acute pulmonary embolism as detailed.
2. Small subpleural nodular opacities noted in the right middle lobe and
right lower lobe. These may represent postinflammatory change. These are not
well seen on the prior study. These can be monitored with a follow-up CT
chest in 6 months.
3. Stable low-attenuation left hepatic lesion. This was felt to represent a
hemangioma on ultrasound.
4. Stable right renal calculus. Stable low-attenuation cortical lesion mid
left kidney.
Left hepatic lesion and left renal lesions can be further assessed with
nonemergent follow-up  MRI with gadolinium.
5. Status post gastric bypass. No evidence of contrast extravasation. No
abnormal fluid collection.
 
VTE: indeterminate due to suboptimal contrast-enhancement.
 
DICTATED BY: GUIDO JANE MD 
DATE/TIME DICTATED:17
:CODY 
DATE/TIME TRANSCRIBED:17
 
CONFIDENTIAL, DO NOT COPY WITHOUT APPROPRIATE AUTHORIZATION.
 
 <Electronically signed in Other Vendor System>                                 
          
                                           SIGNED BY: GUIDO JANE MD 17
 
 
 
(REN LEE)
Diagnostic Imaging:
Viewed by Me: CT Scan.  Discussed w/RAD: CT Scan. 
Initial ED EKG: normal intervals, normal p-waves, normal QRS complex, normal 
sinus rhythm (70)
Prior EKG: unchanged
Rhythm Strip: normal sinus rhythm
(REN LEE)
 
Departure
 
Departure
Time of Disposition: 
Disposition: HOME OR SELF CARE
Condition: Stable
Clinical Impression
Primary Impression: Abdominal pain
Referrals:
MILVIA DOMINGUEZ DO, MD,DESIRAE (PCP/Family)
 
Additional Instructions:
follow up with dr dominguez tomorrow. dilaudid as discussed for breakthrough pain.
zofran for nausea. return at anytime sooner with any concerns. these were sent 
to [facility] [place]
Departure Forms:
Customer Survey
General Discharge Information
Prescriptions:
Current Visit Scripts
Hydromorphone HCl (Dilaudid) 1 TAB PO BIDP PRN PAIN
     #8 TAB 
 
Ondansetron (Zofran Odt) 1 TAB SL TID PRN NAUSEA
     #10 TAB 
 
 
(REN LEE)
 
PA/NP Co-Sign Statement
Statement:
ED Attending supervision documentation-
 
[] I saw and evaluated the patient. I have also reviewed all the pertinent lab 
results and diagnostic results. I agree with the findings and the plan of care 
as documented in the PA's/NP's documentation. 
 
[x] I have reviewed the ED Record and agree with the PA's/NP's documentation.
 
[] Additions or exceptions (if any) to the PAs/NP's note and plan are 
summarized below:
[]
 
(MIREYA NICK DO)

## 2017-03-28 NOTE — CT SCAN REPORT
EXAMINATION:
CT ANGIOGRAM OF THE CHEST WITH AND WITHOUT CONTRAST (CT PULMONARY ANGIOGRAM
FOR PE)
CT ABDOMEN AND PELVIS WITH CONTRAST
 
CLINICAL INFORMATION:
Dyspnea. Recent gastric bypass surgery.
 
COMPARISON:
CT abdomen and pelvis dated 01/12/2017
 
TECHNIQUE:
Prior to contrast administration, noncontrast localization images were
obtained. Subsequently, multidetector volumetric imaging was performed from
the thoracic inlet to below the diaphragms following the administration of 95
mL Optiray 300 intravenous contrast.
No contrast reaction reported.
Sagittal, coronal, and MIP oblique sagittal reformatted images were obtained
on the CT workstation, uploaded to PACS, and reviewed.
Total exam dose-length product 1684.02 mGy-cm.
 
FINDINGS:
 
QUALITY OF STUDY/CONTRAST BOLUS: Suboptimal
 
PULMONARY ARTERIES: Suboptimal enhancement of the pulmonary arteries with
greater contrast in the pulmonary veins. This limits the evaluation of the
lobar and segmental pulmonary arteries. No gross evidence of pulmonary
embolism in the main pulmonary artery.
 
THORACIC AORTA: No aneurysm or dissection.
 
LUNG: Subpleural nodular opacity measuring approximately 0.9 cm anterior
aspect right middle lobe (coronal image 21 and series 2 image 276). Small
subpleural nodules also noted in the right lower lobe measuring approximately
0.6 cm (series 2 image 271). Minor subsegmental atelectasis noted in the
right lower lobe adjacent to the major fissure (series 2 image 277).
 
PLEURA: No pleural effusion or pneumothorax.
 
MEDIASTINUM: Normal heart size. No pericardial effusion. No hilar or
mediastinal lymphadenopathy. No evidence of septal bowing or right heart
strain.
 
CHEST WALL/AXILLA: No axillary or internal mammary lymphadenopathy.
 
LIVER, GALLBLADDER AND BILIARY TREE: Low-attenuation hepatic foci are noted
in the left hepatic lobe. Subcapsular hypodensity along the falciform
ligament segment 4A left hepatic lobe measuring 2 x 1.8 cm (series 5 image
24) may represent transient hepatic attenuation difference. Additional
low-attenuation lesion segment 2 left hepatic lobe (series 5 image 22)
measuring 1.6 x 1.5 cm. Hounsfield units are compatible with a solid lesion.
This has remained stable over the interval.
No additional lesions identified. Status post cholecystectomy. Stable minor
prominence of the common bile duct measuring approximately 0.7 cm compatible
with postcholecystectomy change.
 
Spleen: Unremarkable.
 
Pancreas: Unremarkable.
 
Adrenal glands: Unremarkable.
 
Kidneys: Stable nonobstructing calculus upper pole right kidney measuring
approximately 0.6 cm. Previously seen left upper pole calculus is no longer
visualized.
Duplicated left renal collecting system.
Small cortical scar upper pole right kidney noted again.
Stable low-attenuation cortical lesion mid left kidney measuring 1.1 cm.
Hounsfield units do not reflect simple cyst. Differential possibility
includes complex proteinaceous or hemorrhagic cyst. Since the lesion was not
visible on ultrasound, further assessment with nonemergent renal MRI may be
of value.
 
GI tract: Postoperative gastric bypass changes. Small amount of orally
administered contrast is noted in the mid to distal esophagus, gastric pouch
as well as the jejunal loops. No gross evidence of extravasation.
No evidence of obstruction. Colonic diverticulosis. No evidence of acute
diverticulitis.
 
Lymphovascular structures: Unremarkable.
 
Pelvis: Dominant cysts bilateral ovaries/adnexa compatible with physiologic
changes in this age group. No abnormal fluid collection or abscess formation.
 
OSSEOUS STRUCTURES: Degenerative changes. No acute osseous abnormality.
 
 
IMPRESSION:
 
1. Suboptimal enhancement of the pulmonary arteries limiting their evaluation
of acute pulmonary embolism as detailed.
2. Small subpleural nodular opacities noted in the right middle lobe and
right lower lobe. These may represent postinflammatory change. These are not
well seen on the prior study. These can be monitored with a follow-up CT
chest in 6 months.
3. Stable low-attenuation left hepatic lesion. This was felt to represent a
hemangioma on ultrasound.
4. Stable right renal calculus. Stable low-attenuation cortical lesion mid
left kidney.
Left hepatic lesion and left renal lesions can be further assessed with
nonemergent follow-up  MRI with gadolinium.
5. Status post gastric bypass. No evidence of contrast extravasation. No
abnormal fluid collection.
 
VTE: indeterminate due to suboptimal contrast-enhancement.

## 2017-05-02 ENCOUNTER — HOSPITAL ENCOUNTER (INPATIENT)
Dept: HOSPITAL 68 - ERH | Age: 47
LOS: 6 days | DRG: 244 | End: 2017-05-08
Attending: INTERNAL MEDICINE | Admitting: INTERNAL MEDICINE
Payer: COMMERCIAL

## 2017-05-02 VITALS — BODY MASS INDEX: 35.77 KG/M2 | WEIGHT: 236 LBS | HEIGHT: 68 IN

## 2017-05-02 VITALS — SYSTOLIC BLOOD PRESSURE: 152 MMHG | DIASTOLIC BLOOD PRESSURE: 86 MMHG
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
## 2017-05-02 DIAGNOSIS — I24.8: ICD-10-CM

## 2017-05-02 DIAGNOSIS — K91.1: ICD-10-CM

## 2017-05-02 DIAGNOSIS — D18.03: ICD-10-CM

## 2017-05-02 DIAGNOSIS — E16.2: ICD-10-CM

## 2017-05-02 DIAGNOSIS — E86.1: ICD-10-CM

## 2017-05-02 DIAGNOSIS — K21.9: ICD-10-CM

## 2017-05-02 DIAGNOSIS — G47.33: ICD-10-CM

## 2017-05-02 DIAGNOSIS — E66.01: ICD-10-CM

## 2017-05-02 DIAGNOSIS — K95.89: ICD-10-CM

## 2017-05-02 DIAGNOSIS — I49.1: ICD-10-CM

## 2017-05-02 DIAGNOSIS — K57.32: Primary | ICD-10-CM

## 2017-05-02 DIAGNOSIS — E87.6: ICD-10-CM

## 2017-05-02 DIAGNOSIS — E83.42: ICD-10-CM

## 2017-05-02 DIAGNOSIS — I10: ICD-10-CM

## 2017-05-02 LAB
ABSOLUTE GRANULOCYTE CT: 8.6 /CUMM (ref 1.4–6.5)
APTT BLD: 32 SEC (ref 25–37)
BASOPHILS # BLD: 0 /CUMM (ref 0–0.2)
BASOPHILS NFR BLD: 0.4 % (ref 0–2)
EOSINOPHIL # BLD: 0 /CUMM (ref 0–0.7)
EOSINOPHIL NFR BLD: 0.2 % (ref 0–5)
ERYTHROCYTE [DISTWIDTH] IN BLOOD BY AUTOMATED COUNT: 15.6 % (ref 11.5–14.5)
GRANULOCYTES NFR BLD: 82.6 % (ref 42.2–75.2)
HCT VFR BLD CALC: 45.1 % (ref 37–47)
LYMPHOCYTES # BLD: 1.4 /CUMM (ref 1.2–3.4)
MCH RBC QN AUTO: 30.2 PG (ref 27–31)
MCHC RBC AUTO-ENTMCNC: 33.5 G/DL (ref 33–37)
MCV RBC AUTO: 90.3 FL (ref 81–99)
MONOCYTES # BLD: 0.3 /CUMM (ref 0.1–0.6)
PLATELET # BLD: 234 /CUMM (ref 130–400)
PMV BLD AUTO: 10.6 FL (ref 7.4–10.4)
PROTHROMBIN TIME: 11 SEC (ref 9.4–12.5)
RED BLOOD CELL CT: 5 /CUMM (ref 4.2–5.4)
WBC # BLD AUTO: 10.4 /CUMM (ref 4.8–10.8)

## 2017-05-02 NOTE — ED SYNCOPE COMPLAINT
History of Present Illness
 
General
Chief Complaint: General Adult
Stated Complaint: BIBA SYNCOPE
Source: patient, family
Exam Limitations: no limitations
Allergies
Coded Allergies:
No Known Allergies (10/25/16)
 
Reconcile Medications
Ergocalciferol (Vitamin D2) (Vitamin D2) 50,000 UNIT CAPSULE   1 CAP PO QMON 
SUPPLEMENT  (Reported)
Multivitamin (Multi-Day Vitamins) 1 EACH TABLET   1 TAB PO DAILY SUPPLEMENT-
BARIATRIC  (Reported)
 
Triage Note:
PT BROUGHT TO ROOM IN . FREQUENT SYNCOPAL
 EPISODES. LIMP POSTURE. PALE AND DIAPHORETIC. NO
 APPARENT RESPIRATORY DIFFICULTY. PT ASKING "WHERE
 ARE WE?"
Triage Nurses Notes Reviewed? yes
HPI:
This patient is a 47-year-old female who presented to the emergency department 
today for evaluation of multiple syncopal episodes, chest pain, and abdominal 
pain.  Patient reported that she was seen here several weeks ago for the same.  
She reported that she was told that it was a gastrointestinal bug.  She reported
that she was feeling fine until this morning when she developed a 9 out of 10 
chest pain which is in the center of her chest and nonradiating.  She reported 
that it feels like it is difficult to breathe and difficult to catch her breath.
 She reported the pain is sharp and constant.  She also reported mild abdominal 
pain.  She reported, "constant," episodes of diarrhea today with no blood in the
diarrhea.  The patient reported diaphoresis and chills.  Tactile fevers.  The 
patient has been reported that she has passed out multiple times today and lost 
consciousness in the car coming here to the emergency department.  She reported 
that this happened last time as well.  The patient denied any visual changes, 
arm pain, vomiting, back pain, or any other associated symptoms.  This patient 
is status post gastric bypass in 2017.
(RUMA CESPEDES,GORDO)
 
Vital Signs & Intake/Output
Vital Signs & Intake/Output
 Vital Signs
 
 
Date Time Temp Pulse Resp B/P B/P Pulse O2 O2 Flow FiO2
 
     Mean Ox Delivery Rate 
 
 1639 98.6 51 20 142/70  98 Room Air  
 
 0813 98.0 58 20 140/80  97 Room Air  
 
 1955 97.8 96 20 152/86  98   
 
 1906 99.5 68 16 152/80  99 Room Air  
 
 1730 99.2 62 16 162/80  95 Room Air  
 
 
 ED Intake and Output
 
 
  0000  1200
 
Intake Total 1550 
 
Output Total  
 
Balance 1550 
 
   
 
Intake, IV 1250 
 
Intake, Oral 300 
 
Number 0 
 
Bowel  
 
Movements  
 
Patient 240 lb 
 
Weight  
 
Weight Reported by Patient 
 
Measurement  
 
Method  
 
 
 
Past History
 
Medical History
Any Pertinent Medical History? see below for history
Neurological: NONE
EENT: NONE
Cardiovascular: hypertension
Respiratory: NONE
Gastrointestinal: POST OP N/V
Hepatic: NONE
Renal: NONE
Musculoskeletal: NONE
Psychiatric: NONE
Endocrine: NONE
Cancer(s): NONE
History of MRSA: No
History of VRE: No
History of CDIFF: No
 
Surgical History
Surgical History: cholecystectomy, GASTRIC BYPASS
 
Psychosocial History
Who do you live with Daughter
Services at Home None
What is your primary language English
 
Family History
Hx Contributory? No
(GORDO HENDRIX PA-C)
 
Review of Systems
 
Review of Systems
Constitutional:
Reports: see HPI. 
EENTM:
Reports: no symptoms. 
Respiratory:
Reports: see HPI. 
Cardiovascular:
Reports: see HPI. 
GI:
Reports: see HPI. 
Genitourinary:
Reports: no symptoms. 
Musculoskeletal:
Reports: no symptoms. 
Skin:
Reports: no symptoms. 
Neurological/Psychological:
Reports: see HPI. 
All Other Systems: Reviewed and Negative
(GORDO HENDRIX PA-C)
 
Physical Exam
 
Physical Exam
Cranial Nerves: normal hearing, normal speech, PERRL
Comments:
Well-developed well-nourished person in no acute distress
HEENT: Normal EENT exam, head normocephalic, moist mucous membranes
PERRLA bilaterally
Neck: Supple, no lymphadenopathy
Back: Normal gait
Cardiovascular: Regular rate and rhythm with no murmurs, rubs, or gallops
Respiratory: Chest nontender. No respiratory distress. Breath sounds clear to 
auscultation bilaterally with no wheezes, rales, or rhonchi
Abdomen: Mild epigastric tenderness to palpation with no rebound or guarding.  
Soft and nondistended.  No organomegaly.  No peritoneal signs
Extremity: Normal and equal pulses.
Neuro: Alert oriented x3, cranial nerves II through XII grossly intact.
Skin: No appreciable rash on exposed skin, skin is warm and dry.
Psych: Mood and affect is normal
 
Core Measures
ACS in differential dx? Yes
CVA/TIA Diagnosis: No
Severe Sepsis Present: No
Septic Shock Present: No
(GORDO HENDRIX PA-C)
 
Progress
Differential Diagnosis: AMI, aortic dissection, aortic valve, drug induced 
syncope, hyperventilation, orthostatic syncope, other valvular disease, 
pacemaker malfunction, pericardial tamponade, pulmonary embolus, seizure, sick 
sinus syndrome, subarachnoid hem., TIA/CVA, vasodepressor syncope, ventricular 
tach/fib, surgical anastomosis leak
Diagnostic Imaging:
Viewed by Me: Radiology Read, CT Scan.  Discussed w/RAD: Radiology Read, CT 
Scan. 
Radiology Impression: PATIENT: ROMINA SLAUGHTER  MEDICAL RECORD NO: [id number] PRESENT
AGE: 47  PATIENT ACCOUNT NO: [id number] : [birth year]  LOCATION: [facility] ORDERING 
PHYSICIAN: GORDO HENDRIX PA-C     SERVICE DATE:  EXAM TYPE: CAT - 
CT ABD & PELVIS W ORAL & IV CO EXAMINATION: CT ABDOMEN AND PELVIS WITH CONTRAST 
CLINICAL INFORMATION: Abdominal pain. Nausea. Bariatric surgery 1 year ago. 
COMPARISON: CT scan abdomen pelvis 2017, 2017 . Ultrasound abdomen 
2016 TECHNIQUE: Multidetector volumetric imaging was performed of the 
abdomen and pelvis after the IV administration of 95 mL of Optiray 320 
intravenous contrast. One cup of barium was administered orally just prior to 
the study. Sagittal and coronal reformatted images were obtained on the 
technologist's workstation. DLP: 1016.17 mGy-cm FINDINGS: LUNG BASES: The 
visualized lung bases are unremarkable. LIVER, GALLBLADDER, AND BILIARY TREE: 
Stable focal 2 cm hypodensity left lobe of liver. This is an echogenic lesion on
the ultrasound study of 2016. Focal fatty change at the falciform ligament
the left lobe of liver is stable. No intrahepatic bile duct dilatation. Status 
post cholecystectomy. Extrahepatic CBD measures 1 cm. No calcified stone in the 
bile ducts. PANCREAS: Unremarkable. SPLEEN: Unremarkable. ADRENAL GLANDS: 
Unremarkable. KIDNEYS AND URETERS: There is a nonobstructive 3 mm stone in the 
upper pole the right kidney. No stone in left kidney. There is no ureteral 
calculus and no hydronephrosis. Small cortical cyst midpole of the left kidney. 
BLADDER: Unremarkable. GASTROINTESTINAL TRACT: Status post gastric bypass. 
Contrast passes through the gastric pouch into small bowel without obstruction. 
Contrast passes through the small bowel anastomosis in left upper quadrant 
without obstruction. There is mild diverticulosis of the left colon and sigmoid.
At the distal descending colon there is subtle pericolonic edema related to the 
diverticula consistent with diverticulitis, axial image 57 (2). There is no 
perforation or abscess. There is no obstruction. The appendix is normal. The 
small bowel loops are normal. ABDOMINAL WALL: No significant hernia is 
appreciated. LYMPH NODES: Normal. VASCULAR: Unremarkable. PELVIC VISCERA: Uterus
is retroverted. There is a 2.4 cm hypodensity right adnexa, dominant follicle/
cyst No fluid in the cul-de-sac. OSSEOUS STRUCTURES: Multilevel degenerative 
spondylosis of the spine. Degenerative joint disease of hips bilateral right 
greater than left bone island in right sacral ala. IMPRESSION: 1. Small focus of
diverticulitis involving the distal descending colon. 2. Status post gastric 
bypass. 3. Status post cholecystectomy. 4. Nonobstructive 3 mm stone upper pole 
of the right kidney. DICTATED BY: AMANDO MART MD  DATE/TIME DICTATED:17 :RAD.WATERS  DATE/TIME TRANSCRIBED:17 
CONFIDENTIAL, DO NOT COPY WITHOUT APPROPRIATE AUTHORIZATION.  <Electronically 
signed in Other Vendor System>                                                  
                                     SIGNED BY: AMANDO MART MD 17 1632
CXR Impression: PATIENT: ROMINA SLAUGHTER  MEDICAL RECORD NO: [id number] PRESENT AGE: 
47  PATIENT ACCOUNT NO: [id number] : 68  LOCATION: [facility] ORDERING PHYSICIAN:
GORDO HENDRIX PA-C     SERVICE DATE:  EXAM TYPE: RAD - XRY-CHEST 
XRAY, PA AND LATERAL EXAMINATION: XR CHEST CLINICAL INFORMATION: Chest pain 
COMPARISON: CTA chest from 2017 TECHNIQUE: 2 views of the chest were 
obtained. FINDINGS: The cardiomediastinal silhouette is normal. The lungs appear
clear. No consolidation, pulmonary edema, pleural effusion, or pneumothorax. 
There is stable mild elevation of the right hemidiaphragm. There are mild 
degenerative changes of the spine. No evidence of acute osseous abnormality. 
IMPRESSION: No acute abnormality. DICTATED BY: MAHESH DOYLE MD  DATE/TIME 
DICTATED:17 :RAD.WATERS  DATE/TIME TRANSCRIBED:1628 CONFIDENTIAL, DO NOT COPY WITHOUT APPROPRIATE AUTHORIZATION.  <
Electronically signed in Other Vendor System>                                   
                                                    SIGNED BY: MAHESH DOYLE MD 
17
Initial ED EKG: normal axis, normal intervals, normal sinus rhythm, no ST T wave
changes, 56 bpm
(RUMA CESPEDES,GORDO)
Plan of Care:
 Orders
 
 
Procedure Date/time Status
 
Bariatric Diet - Stage 5  B Active
 
BASIC ELECTROLYTES PLUS BUN&CR  0600 Active
 
Clear Liquid Diet  L Complete
 
Regular Diet  B Complete
 
PARTIAL THROMBOPLASTIN TIME  1200 Complete
 
CULTURE,STOOL  1122 Active
 
Change service to  0750 Active
 
Heparin Drip- ACS  0628 Active
 
Weight  0608 Active
 
TROPONIN LEVEL  0600 Complete
 
BASIC ELECTROLYTES PLUS BUN&CR  0600 Complete
 
EKG  0600 Active
 
TROPONIN LEVEL  0000 Complete
 
EKG  0000 Active
 
MISSING MEDICATION FORM   UNK Active
 
EKG   UNK Active
 
Regular Diet  D Complete
 
URINE DRUGS OF ABUSE  1953 Complete
 
Teach/Educate 1920 Active
 
Pain Treatment and Response 1920 Active
 
Nutritional Intake, Monitor 1920 Active
 
Isolation 1920 Active
 
Patient Care Conference  192 Active
 
BLOOD CULTURE  1851 Active
 
CULTURE,URINE  1750 Active
 
URINALYSIS  1750 Complete
 
OXYGEN SETUP (GEN)  1748 Active
 
Saline Lock  1748 Active
 
Admit to inpatient  1748 Active
 
Activity/Ambulation  1748 Active
 
Pathway - chart  1745 Active
 
Pathway - chart  1743 Active
 
House Staff  1743 Active
 
Code Status  1743 Active
 
Patient Data  1707 Active
 
Intake & Output  1552 Active
 
FingerStick- Glucose  1426 Active
 
VTE Mechanical Prophylaxis   UNK Active
 
Vital Signs   UNK Active
 
 
 Current Medications
 
 
  Sig/Bridgett Start time  Last
 
Medication Dose  Stop Time Status Admin
 
Aspirin 81 MG DAILY  1000 CAN 
 
(Aspirin)     
 
Omeprazole 40 MG DAILY AC  0700 CAN 
 
(Prilosec)     
 
Omeprazole 40 MG DAILY AC  0700 AC 
 
(Prilosec)     
 
Clopidogrel Bisulfate 75 MG DAILY  1215 AC 
 
(Plavix)     1326
 
Aspirin 81 MG DAILY  1000 CAN 
 
(Aspirin)     
 
Clopidogrel Bisulfate 75 MG DAILY  1000 CAN 
 
(Plavix)     
 
Multivitamins  1 TAB DAILY  1000 AC 
 
Therapeutic     1009
 
(Theragran-M      
 
Vitamins Tabs)     
 
Hydromorphone HCl 0.6 MG Q4P PRN  1900 AC 
 
(Dilaudid)     1326
 
Oxycodone/ 1 TAB Q6P PRN  1900 AC 
 
Acetaminophen     
 
(Percocet)     
 
Ampicillin Sodium/ 3,000 MG Q6  1833 AC 
 
Sulbactam Sodium     1252
 
(Unasyn)     
 
Sodium Chloride 100 ML    
 
(Normal Saline 0.9%)     
 
Acetaminophen 650 MG Q6P PRN  1745 AC 
 
(Tylenol)     
 
Ondansetron HCl 4 MG Q6P PRN  1745 AC 
 
(Zofran)     
 
 
 Laboratory Tests
 
 
 
17 1150:
APTT 53  H
 
17 1013:
Urinalysis LIGHT  H, Urine Color YEL, Urine Clarity HAZY  H, Urine pH 6.5, Ur 
Specific Gravity 1.025, Urine Protein TRACE  H, Urine Ketones 15  H, Urine 
Nitrite NEG, Urine Bilirubin NEG@ICTO, Urine Urobilinogen 0.2, Ur Leukocyte 
Esterase NEG, Ur Microscopic SEDIMENT EXAMINED, Urine RBC 15-25  H, Urine WBC 10
-15  H, Ur Epithelial Cells MANY  H, Urine Bacteria MANY  H, Urine Hemoglobin 
LARGE  H, Urine Glucose NEG
 
17 0700:
Anion Gap 11, Estimated GFR > 60, BUN/Creatinine Ratio 20.0, Troponin I 0.22 *H
 
17 0010:
Troponin I 0.40 *H
 
17 1830:
Troponin I 0.19 *H
 Microbiology
 1122  STOOL: Stool Culture - COLB
 1013  URINE ROUT: Urine Culture - RECD
2050  BLOOD: Blood Culture - RES
2040  BLOOD: Blood Culture - RES
 
 
Departure
 
Departure
Disposition: STILL A PATIENT
Condition: Stable
Clinical Impression
Primary Impression: Diverticulitis
Qualifiers:  Diverticulitis site: unspecified part of intestinal tract 
Diverticulitis bleeding: without bleeding Diverticulitis complication: without 
perforation or abscess Qualified Code: K57.92 - Diverticulitis of intestine, 
part unspecified, without perforation or abscess without bleeding
Secondary Impressions: 
Syncope
Qualifiers:  Syncope type: unspecified Qualified Code: R55 - Syncope and 
collapse
 
Referrals:
DESIRAE GRUBBS MD (PCP/Family)
 
Departure Forms:
Customer Survey
General Discharge Information
 
Admission Note
Spoke With:
ELEANOR ROMO MD
Documentation of Exam:
Documentation of any treatments & extenuating circumstances including Concerns 
Regarding Discharge (functional status, medication knowledge or non-compliance, 
living conditions, etc.) that warrant an admission rather than observation: [
This patient is a 47-year-old female who presented to the emergency department 
today for evaluation of abdominal pain, chest pain, and syncope.  Diverticulitis
seen on CT scan of the abdomen and pelvis.  Troponin 0.08.  On arrival this 
patient was tachycardic.  Elevated d-dimer.  This patient will need to be 
admitted to the hospital for IV antibiotics, VQ scan to rule out pulmonary 
embolism, GI consultation, serial troponin levels, serial EKGs, trend labs, IV 
fluids, IV antiemetics, and close monitoring.  Premature discharge could prove 
medically harmful.  This patient is a poor candidate for outpatient treatment.]
 
(RUMA CESPEDES,GORDO)
 
PA/NP Co-Sign Statement
Statement:
ED Attending supervision documentation-
 
x I saw and evaluated the patient. I have also reviewed all the pertinent lab 
results and diagnostic results. I agree with the findings and the plan of care 
as documented in the PA's/NP's documentation. 
 
[] I have reviewed the ED Record and agree with the PA's/NP's documentation.
 
[] Additions or exceptions (if any) to the PAs/NP's note and plan are 
summarized below:
[]
 
(JAY LOPEZ,MARLINE)

## 2017-05-02 NOTE — NUR
**CRITICAL TEST RESULTS**
 
[id number] ROMINA SLAUGHTER 47 F
 
TESTS AND RESULTS: TROPONIN 0.19
 
Results received and read back by:   IRVIN FULTON
   Results received date and time:   05/02/17 1927
 
The following provider was notified of the results, and read the results back:
                       DR. FIGUEROA
       
Notified date and time:  05/02/17 at 1928

## 2017-05-02 NOTE — HISTORY & PHYSICAL
SUNSHINE TRINH 05/02/17 1812:
General Information and HPI
MD Statement:
I have seen and personally examined ROMINA SLAUGHTER and documented this H&P.
 
The patient is a 47 year old F who presented with a patient stated chief 
complaint of presyncope
 
Source of Information: patient, old records
History of Present Illness:
 
Ms Slaughter is a 47-year-old woman who was known to be in her usual state of health 
until 1 day ago.  She has a past medical history of hypertension, obesity (
gastric bypass surgery recently).  She came to the ER for evaluation of near 
loss of consciousness 1 day.
 
As per the patient, she felt normal when she woke up this a.m., and experienced 
nausea a few hours after.  Reports several episodes of vomiting 20 times, 
nonbilious, nonbloody, frothy.  Also reported several episodes of diarrhea 
associated with abdominal pain.  Pain 6/10, located around the umbilicus, which 
radiated into the epigastric region.  No fever, cough, change in by mouth intake
, history of food contamination, foreign travel.  No shortness of breath, chest 
pain, palpitations, neurological weakness, loss of bladder or bowel function.  
Reported similar symptoms 3 weeks ago, which subsided without any medical 
intervention.
 
Has seen the bariatric surgeon a week ago, and was reported to have normal 
examination.  Also has been following up with Dr. Grubbs's office for abnormal 
thyroid function test.  
 
Allergies/Medications
Allergies:
Coded Allergies:
No Known Allergies (10/25/16)
 
Home Med list
Ergocalciferol (Vitamin D2) (Vitamin D2) 50,000 UNIT CAPSULE   1 CAP PO QMON 
SUPPLEMENT  (Reported)
Multivitamin (Multi-Day Vitamins) 1 EACH TABLET   1 TAB PO DAILY SUPPLEMENT-
BARIATRIC  (Reported)
 
Compliance With Home Meds: GOOD
 
Past History
 
Travel History
Traveled to [place] past 21 day No
 
Medical History
Neurological: NONE
EENT: NONE
Cardiovascular: hypertension
Respiratory: NONE
Gastrointestinal: POST OP N/V
Hepatic: NONE
Renal: NONE
Musculoskeletal: NONE
Psychiatric: NONE
Endocrine: NONE
Cancer(s): NONE
History of MRSA: No
History of VRE: No
History of CDIFF: No
 
Surgical History
Surgical History: cholecystectomy, GASTRIC BYPASS
 
Past Family/Social History
 
Family History
Relations & Conditions if any
Relation not specified for:
  *No pertinent family history
 
 
Psychosocial History
Services at Home: None
Smoking Status: Never Smoked
ETOH Use: occasional use
Illicit Drug Use: denies illicit drug use
 
Functional Ability
ADLs
Independent: dressing, eating, toileting, bathing. 
Ambulation: independent
IADLs
Independent: shopping, housework, finances, food prep, telephone, transportation
, medication admin. 
 
Review of Systems
 
Review of Systems
Constitutional:
Denies: chills, fever. 
EENTM:
Denies: blurred vision, double vision. 
Cardiovascular:
Denies: chest pain, edema, orthopena, palpitations. 
Respiratory:
Denies: cough, orthopnea, short of breath, sputum production. 
GI:
Reports: abdominal pain, diarrhea, nausea.  Denies: distention, melena, bloody 
stool, changes in stool. 
Genitourinary:
Denies: dysuria, hematuria. 
Musculoskeletal:
Denies: back pain, joint pain. 
Skin:
Denies: change in skin color, jaundice. 
Neurological/Psychological:
Denies: depressed, headache. 
Hematologic/Endocrine:
Denies: bleeding. 
Immunologic/Allergic:
Denies: lymphadenopathy. 
 
Exam & Diagnostic Data
Last 24 Hrs of Vital Signs/I&O
 Vital Signs
 
 
Date Time Temp Pulse Resp B/P B/P Pulse O2 O2 Flow FiO2
 
     Mean Ox Delivery Rate 
 
05/02 1955 97.8 96 20 152/86  98   
 
05/02 1906 99.5 68 16 152/80  99 Room Air  
 
05/02 1730 99.2 62 16 162/80  95 Room Air  
 
05/02 1620  57 18 196/79   Room Air  
 
05/02 1506  66 16 145/68     
 
05/02 1445      100 Room Air  
 
05/02 1410 98.7 58 20 175/85  100 Room Air  
 
 
 Intake & Output
 
 
 05/02 1600 05/02 0800 05/02 0000
 
Intake Total 1000  
 
Output Total   
 
Balance 1000  
 
    
 
Intake, IV 1000  
 
Patient 240 lb  
 
Weight   
 
 
 
 
Physical Exam
General Appearance Alert, Oriented X3, Cooperative, No Acute Distress, Mild 
Distress
Skin No Rashes, No Breakdown
Skin Temp/Moisture Exam: Warm/Dry
Sepsis Skin Exam (color): Normal for Ethnicity
HEENT Atraumatic, PERRLA, EOMI
Neck Supple, No JVD, No thryomegaly
Lymphatic Axillary nl, Cervical nl
Cardiovascular Regular Rate, Normal S1, Normal S2, No Murmurs
Lungs Normal Air Movement
Abdomen Normal Bowel Sounds, Soft, No Hepatospenomegaly (epigastric tenderness)
Neurological Normal Speech, Strength at 5/5 X4 Ext, Normal Tone, Sensation 
Intact, Cranial Nerves 3-12 NL, Reflexes 2+
Extremities No Cyanosis, No Edema, Normal Pulses, No Tenderness/Swelling
Vascular Pulses Symmetrical
Sepsis Peripheral Pulse Location: Dorsalis Pedis
Sepsis Peripheral Pulse Exam: Normal
Last 24 Hrs of Labs/Devaughn:
 Laboratory Tests
 
05/02/17 1830:
Troponin I 0.19 *H
 
05/02/17 1422:
Anion Gap 17  H, Estimated GFR > 60, BUN/Creatinine Ratio 25.0, Glucose 137  H, 
Lactic Acid 1.8, Calcium 9.5, Magnesium 1.8, Total Bilirubin 0.7, Direct 
Bilirubin 0.4, AST 18, ALT 35, Alkaline Phosphatase 89, Troponin I 0.08, Total 
Protein 7.8, Albumin 4.6, Globulin 3.2, Albumin/Globulin Ratio 1.4, Amylase 47, 
Lipase 103, Total Beta HCG NEGATIVE, CBC w Diff NO MAN DIFF REQ, RBC 5.00, MCV 
90.3, MCH 30.2, RDW 15.6  H, MPV 10.6  H, Gran % 82.6  H, Lymphocytes % 13.6  L,
Monocytes % 3.2, Eosinophils % 0.2, Basophils % 0.4, Absolute Granulocytes 8.6  
H, Absolute Lymphocytes 1.4, Absolute Monocytes 0.3, Absolute Eosinophils 0, 
Absolute Basophils 0, PUBS MCHC 33.5
 
05/02/17 1421:
PT 11.0, INR 1.05, APTT 32, D-Dimer 615  H
 Microbiology
05/02 2050  BLOOD: Blood Culture - RECD
05/02 2040  BLOOD: Blood Culture - RECD
05/02 1750  URINE ROUT: Urine Culture - ORD
 
 
 
Assessment/Plan
Assessment:
 
She is a younger woman with a past history of obesity, gastric bypass surgery, 
hypertension is being evaluated for several episodes of vomiting and diarrhea.
 
At the time of admission, vitals 98.7, respiration 20, pulse rate 58, blood 
pressure 175/85 (improved later ), pulse ox 100% on room air.  Findings 
indicated WBC 10.4, hemoglobin 15.1, platelets 234, normal electrolytes-sodium 
140, potassium 3.8, anion gap 17, bicarbonate 17, BUN 15.  Creatinine 0.6, 
lactic acid 1.8, d-dimer 615, EKG revealed normal sinus rhythm, heart rate 51, 
slightly elevated T waves.
 
Radiological findings-chest x-ray negative for any acute process.  CT abdomen 
and pelvis with oral and IV contrast revealed small focus of diverticulitis 
involving the distal descending colon.  Also was found a nonobstructing 3 mm 
stone in the upper pole of the right kidney (chronic as per the patient).
 
Below is the problem list and plan:
 
#1 nausea, vomiting, diarrhea-likely, common etiology is viral gastroenteritis. 
Since the patient underwent gastric bypass, acute changes in Microbiology data 
could possibly cause diarrhea.  Metabolic abnormalities resulting from vomiting 
and diarrhea are being corrected.  Incidental finding of diverticulitis, could 
also cause the symptoms.  Currently started on broad antibiotic spectrum-Unasyn.
 May discontinue after discussing with the gastroenterologist.
 
#2 hypertension- Currently off medications.
 
#3 presyncope-monitor the patient for at least 24 hours on telemetry.  Monitor 
for arrhythmias, heart block.  May benefit from an echocardiogram at this time. 
Check orthostatic vitals.  Also check TSH, free T4.
 
#4 DVT prophylaxis-pharmacological.
 
As Ranked By This Provider
Problem List:
 1. Syncope
   Qualifiers
 Syncope type: unspecified Qualified Code: R55 - Syncope and collapse
 
 2. Diverticulitis
   Qualifiers
 Diverticulitis site: unspecified part of intestinal tract Diverticulitis 
bleeding: without bleeding Diverticulitis complication: without perforation or 
abscess Qualified Code: K57.92 - Diverticulitis of intestine, part unspecified, 
without perforation or abscess without bleeding
 
 3. Hypertension
 
 4. GERD (gastroesophageal reflux disease)
 
 
Core Measures/Miscellaneous
 
Acute Coronary Syndrome
ACS Diagnosis: No
 
Cerebrovascular Accident
CVA/TIA Diagnosis: No
 
Congestive Heart Failure
CHF Diagnosis: No
 
Venous Thromboembolism
VTE Risk Factors: Acute medical illness, Age > 40
No [facility] VTE prophylaxis d/t: No contraindications
No VTE Pharm Prophylaxis d/t: No contraindications
VTE Diagnosis: No
VTE Type: NONE
VTE Confirmed by (Test): NONE
 
Severe Sepsis
Severe Sepsis Present: No
 
Septic Shock
Septic Shock Present: No
 
Miscellaneous Documentation
Attending Case Discussed With:
ELEANOR ROMO MD
 
Primary Care Physician:
DESIRAE GRUBBS MD
 
Patient sees these Specialists
Dr. Crespo
Level of Patient Care: Telemetry
 
SHERYL GILLESPIE MD 05/02/17 5374:
General Information and [facility]
MD Statement:
I have seen and personally examined ROMINA SLAUGHTER and documented this H&P.
 
The patient is a 47 year old F who presented with a patient stated chief 
complaint of [].
 
 
 
Resident Review Statement
Resident Statement: examined this patient, discussed with intern, agreed with 
intern, reviewed EMR data (avail), reviewed images, amended to note
Other Findings:
This is 47 year old female with past medical history of morbid obesity s/p 
laparoscopic gastric bypass on 02/22/2017 with hiatal hernia repair, who lost 
about 50 pounds post surgery, history of hypertension, GERD presented from home 
with chief complaint of 1 day history of multiple episode of non-bloody vomiting
and diarrhea with episodes of blackout without losing consciousness.  Patient 
had similar symptoms 3 weeks ago and was seen in emergency department and at 
that time she was discharged with suspected diagnosis of viral gastroenteritis. 
Patient was evaluated by her bariatric surgeon a week ago and her exam was fine 
at that time.  Patient was seen by her PCP a day prior to admission for 
evaluation of abnormal thyroid function.  This morning when she woke up she felt
not good and then started having multiple episode of nonbloody vomiting followed
by multiple episodes of diarrhea.  She also had episodes where she blacked out 
but denies any loss of consciousness or fall.  She also reports abdominal pain 
radiating up to chest and to the right side of the abdomen associated with 
sweating and chills.  She reports 9/10 epigastric abdominal pain non-relieved by
morphine.  She denies any sick contact, eating outside, recent travel.
 
Her vitals on admission were T 98.7, HR 66, RR 16, /85, O2 sat 100% on 
room air
On physical exam patient is alert oriented 3 in no acute distress, HEENT PERRLA
EOMI, neck supple, heart S1-S2 normal without murmur, lungs clear on 
auscultation, abdomen soft with noted mild epigastric tenderness and right lower
quadrant tenderness without guarding and rigidity, no CVA tenderness, no focal 
gross neuro deficit, no peripheral edema.
 
Labs were significant for WBC 10,400, H&H 15.1/45.1, platelet 224, K3.8, BUN 15,
creatinine 0.6, anion gap 17, blood glucose 137, lactic acid 1.8, troponin 0.08,
mag 1.8, normal LFT.
 
EKG revealed normal sinus rhythm at rate of 56, normal axis, no acute ST-T 
changes 
chest x-ray noted unremarkable
CT abdomen and pelvis with oral and IV contrast revealed small focus of 
diverticulitis involving distal descending colon and nonobstructive 3 mm stone 
upper pole of right kidney
 
Assessment and plan: This is 47 -year-old morbidly obese female who recently 
underwent laparoscopic gastric bypass presented to ER with sudden onset multiple
episode of nonbilious nonbloody vomiting associated with abdominal pain and 
chills followed by diarrhea and episode of blackout without any loss of 
consciousness found to have small focus of diverticulitis of distal descending 
colon. 
 
 1.  Diverticulitis of distal descending colon
- Start empiric antibiotic Unasyn 3 g every 6 hours given CT finding and exam 
findings suggestive of epigastric and right lower quadrant tenderness, in 
setting of persistent vomiting and diarrhea.  Although patient noted afebrile 
with normal WBC count
- Follow-up blood culture 
- follow-up C. difficile
- Awaiting GI consult
- Continue gentle fluid hydration
- Zofran when necessary for nausea
- Pain management
 
2.  Status post gastric bypass
- Continue multivitamin
 
3.  Near syncope secondary to hypovolemia
- IV gentle hydration
 
4.  Chest pain
- Get second set of troponin if negative no need to repeat
 
5.  DVT prophylaxis
Subcutaneous Lovenox
 
6.  Full code
 
 
 
 
 
 
 
 
ELEANOR ROMO MD 05/02/17 [id number]:
Attending MD Review Statement
 
Attending Statement
Attending MD Statement: examined this patient, discuss w/resident/PA/NP, agreed 
w/resident/PA/NP, discussed with family, reviewed EMR data (avail), reviewed 
images, amended to note
Attending Assessment/Plan:
The patient is a 48 yo female who is s/p prior gastric bypass procedure (50 lb 
weight loss), h/o HTN, & GERD who presented in the ED with 1 day h/o nausea, 
vomiting, diarrhea and near loss of consciousness (multiple times). She also 
noted abdominal pain with this. No fever, chills, dyspnea or blood per rectum or
hematemesis. Had some chest pain as well (substernal).
 
Physical Exam: 
VS: 
T 98.7, /79, P 57, R 18
HEENT: eyes- PERRLA, EOMI
           sean- dry mucosa
Neck: no adenopathy
Chest: clear
Cor: RRR, nl s1, S2 w/o murm
Abd: BS+, soft, + mild to moderate epigastric-RLQ tenderness w/o guarding or 
rebound, - HSM
Ext: no edema
Neuro: alert & oriented x 3, non-focal exam
 
Labs/Tests- as above
 
Impression/Plan:
#Intractable Nausea/Vomiting/Diarrhea- ? may have had viral syndrome. No 
obstruction on CT (s/p gastric bypass). 
Plan: IV fluids/hydration, Zofran for nausea, check stool for C-difficile
 
#Abdominal Pain/Diverticulitis- ? low grade diverticulitis. Has FH of this as 
well. 
Plan: Agree with IV Abx (Unasyn at present) and will follow. GI evaluation. 
Dilaudid for pain at present. 
 
#Chest Pain- most likely GERD.
Plan: Will follow. PPI when able to take po. Willl monitor on telemetry and 
check troponins.

## 2017-05-02 NOTE — NUR
PT RETURNED FROM CT SCAN. ABDOMINAL PAIN AND NAUSEA CONTINUE. REGLAN GIVEN PER
ORDER. PT ASSISTED TO COMMODE AT BEDSIDE. NO DIZZINESS, SOB OR INCREASED NAUSEA

## 2017-05-02 NOTE — CT SCAN REPORT
EXAMINATION:
CT ABDOMEN AND PELVIS WITH CONTRAST
 
CLINICAL INFORMATION:
Abdominal pain. Nausea.
Bariatric surgery 1 year ago.
 
COMPARISON:
CT scan abdomen pelvis 01/12/2017, 03/28/2017 .
Ultrasound abdomen 04/25/2016
 
TECHNIQUE:
Multidetector volumetric imaging was performed of the abdomen and pelvis
after the IV administration of 95 mL of Optiray 320 intravenous contrast. One
cup of barium was administered orally just prior to the study.
Sagittal and coronal reformatted images were obtained on the technologist's
workstation.
 
DLP:
1016.17 mGy-cm
 
FINDINGS:
 
LUNG BASES: The visualized lung bases are unremarkable.
 
LIVER, GALLBLADDER, AND BILIARY TREE: Stable focal 2 cm hypodensity left lobe
of liver. This is an echogenic lesion on the ultrasound study of 04/26/2016.
Focal fatty change at the falciform ligament the left lobe of liver is stable.
No intrahepatic bile duct dilatation.
 
Status post cholecystectomy. Extrahepatic CBD measures 1 cm. No calcified
stone in the bile ducts.
 
PANCREAS: Unremarkable.
 
SPLEEN: Unremarkable.
 
ADRENAL GLANDS: Unremarkable.
 
KIDNEYS AND URETERS: There is a nonobstructive 3 mm stone in the upper pole
the right kidney. No stone in left kidney. There is no ureteral calculus and
no hydronephrosis.
Small cortical cyst midpole of the left kidney.
 
BLADDER: Unremarkable.
 
GASTROINTESTINAL TRACT:
Status post gastric bypass. Contrast passes through the gastric pouch into
small bowel without obstruction. Contrast passes through the small bowel
anastomosis in left upper quadrant without obstruction.
There is mild diverticulosis of the left colon and sigmoid. At the distal
descending colon there is subtle pericolonic edema related to the diverticula
consistent with diverticulitis, axial image 57 (2). There is no perforation
or abscess. There is no obstruction.
The appendix is normal. The small bowel loops are normal.
 
ABDOMINAL WALL: No significant hernia is appreciated.
 
LYMPH NODES: Normal.
 
VASCULAR: Unremarkable.
 
PELVIC VISCERA: Uterus is retroverted. There is a 2.4 cm hypodensity right
adnexa, dominant follicle/cyst No fluid in the cul-de-sac.
 
OSSEOUS STRUCTURES: Multilevel degenerative spondylosis of the spine.
Degenerative joint disease of hips bilateral right greater than left bone
island in right sacral ala.
 
IMPRESSION:
 
1. Small focus of diverticulitis involving the distal descending colon.
2. Status post gastric bypass.
3. Status post cholecystectomy.
4. Nonobstructive 3 mm stone upper pole of the right kidney.

## 2017-05-02 NOTE — ADMISSION CERTIFICATION
Admission Certification
 
Certification Statement
- As attending physician, I certify that at the time of
- admission, based on clinical presentation, severity of
- symptoms, need for further diagnostic testing and
- therapeutic interventions, and risk of adverse outcomes
- without in-hospital treatment, in my clinical assessment,
- this patient requires an acute hospital stay for a minimum
- of two nights or longer. I have also considered psychsocial
- factors such as support system, advanced age, financial
- issues, cognitive issues, and failed out-patient treatments,
- past re-admission history, safety of patient, and lack of
- compliance as applicable.
Specific rationale supporting this admission is:
The patient is eingadmitted with persistent nausea, vomiting diarrhea and 
abdominal pain. Has had near syncope and ?diverticulitis on CT. Will admit to 
telemetry to monitor for arrythmia. IV hydration, IV antitiotics (Unasyn), GI 
consult, Zofran. Follow abdominal exam.

## 2017-05-02 NOTE — NUR
PT BROUGHT TO ROOM IN WC. FREQUENT SYNCOPAL
EPISODES. LIMP POSTURE. PALE AND DIAPHORETIC. NO
APPARENT RESPIRATORY DIFFICULTY. PT ASKING "WHERE
ARE WE?"

## 2017-05-02 NOTE — RADIOLOGY REPORT
EXAMINATION:
XR CHEST
 
CLINICAL INFORMATION:
Chest pain
 
COMPARISON:
CTA chest from 03/28/2017
 
TECHNIQUE:
2 views of the chest were obtained.
 
FINDINGS:
The cardiomediastinal silhouette is normal. The lungs appear clear. No
consolidation, pulmonary edema, pleural effusion, or pneumothorax. There is
stable mild elevation of the right hemidiaphragm. There are mild degenerative
changes of the spine. No evidence of acute osseous abnormality.
 
IMPRESSION:
No acute abnormality.

## 2017-05-03 VITALS — DIASTOLIC BLOOD PRESSURE: 60 MMHG | SYSTOLIC BLOOD PRESSURE: 118 MMHG

## 2017-05-03 VITALS — DIASTOLIC BLOOD PRESSURE: 80 MMHG | SYSTOLIC BLOOD PRESSURE: 140 MMHG

## 2017-05-03 VITALS — DIASTOLIC BLOOD PRESSURE: 70 MMHG | SYSTOLIC BLOOD PRESSURE: 142 MMHG

## 2017-05-03 LAB — APTT BLD: 53 SEC (ref 25–37)

## 2017-05-03 NOTE — CONS- GASTROENTEROLOGY
General Information and HPI
 
Consulting Request
Date of Consult: 17
Requested By:
ELEANOR ROMO MD
 
Reason for Consult:
I was called to see the patient for GI consultation today by the hospitalist 
service, to assess nausea, vomiting, and diarrhea, in a complex patient, post 
recent gastrojejunal bypass, with mild lower left colon diverticulitis noted on 
CT
Source of Information: patient, old records
Exam Limitations: no limitations
History of Present Illness:
47-year-old female, hypertensive, non-diabetic, non-hyperlipidemic, mild CUATE, 
DJD, low Vit D, post 
10/26/2016: lap CCKY for cholelithiasis prior to gastric bypass.  2017: 
Lap gastrojejunal bypass & lap HH repair per Dr. Dominguez.  2017: Postop 
UGI series without leak.  The patient reportedly had a preop EGD by her previous
bariatric surgeon, Dr. Herbert, that was reportedly "normal" except for the 
hiatal hernia (I cannot locate the EGD report).  She has not had a colonoscopy. 
The patient's BMI was 46 preoperatively.  She went from 288 pounds to 238 pounds
postop and is 5'8". Aside from both of the patient's parents being alcoholics, 
there is no family history of any inherited liver disease, GI malignancy, or GI 
disease.  The patient claimed a preop Persantine thallium done in 2016 in 
[place] by Dr. Jean was "normal."  
 
The patient went to the [place] [facility] 2017, with nausea, vomiting, diarrhea, 
infraumbilical abdominal pain, and questionable syncope.  Labs then showed 
normal CBC, normal CMP (except for a mildly decreased bicarbonate), normal 
amylase, normal lipase, troponin negative, serum hCG negative.  2017: CTA 
chest/CT AP with IV/po contrast- suboptimal view of pulmonary arteries, tiny 
opacities RML/RLL (felt to be postinflammatory), left hepatic hemangioma (
previously noted on ultrasound), stable right renal stone without hydronephrosis
, otherwise stable post GJ bypass.  The patient was sent from the ER on Zofran.
 
The patient returned to the [place] [place] ER 2017, depending of similar symptoms
of nausea, vomiting, diarrhea, infraumbilical abdominal pain, and questionable 
syncope.  Her GI symptoms were unrelated to po intake.  She denied any preceding
recent travel, recent antibiotics, NSAID use, aspirin, or raw food ingestion.  
She was advancing her bariatric diet.  She claimed she was  solids and
liquids, to avoid dumping syndrome.  The vomitus was nonbilious, as expected 
post-GJ bypass, and was mostly clear & frothy.  There was no hematemesis or 
melena.  She had early satiety as expected postoperatively.  There was no blood 
or mucus in the stool.  She denied any constipation, obstipation, or tenesmus.  
She denied any rashes or acute arthralgias, aside from her DJD.  She was taking 
bariatric supplements.  She otherwise is on no outpatient medications.  She 
denied any GERD, odynophagia, dysphagia, chest pain, pleuritic pain, or 
hemoptysis.  She had mild shortness of breath.  She denied any fevers, chills, 
jaundice, GYN symptoms, URI symptoms, dysuria, frequency, air in the urine, or 
feces in the urine.  She was recently informed of an elevated T4 in 2017.  
Her infraumbilical pain "6 0 out of 10," seemed to radiate to the LLQ.  She 
claimed she vomited 20 times PTA.  The diarrhea was only a few times.  There was
no history of incontinence.  She is somewhat vague about the syncope.  There was
no LOC or seizure.  She denied any palpitations or focal neurologic deficits.  
She is right-handed.  She reportedly had a normal exam at her bariatric surgeon 
1 week PTA.
 
Upon arrival to the [facility] ER 2017, a little after 2 PM, /85, P 58,
T 98.7, R 20, O2 sat %.  She was described as pale and dehydrated.  
Fingerstick showed glucose 117.  She was given IV normal saline, morphine, sulfa
, nitroglycerin, aspirin 325 mg, Zofran, Reglan, Lovenox, multivitamin, and 
Tylenol.
 
2017: low normal thiamine 92, normal iron studies with ferritin 73.4, B12 
905, folate 19, low vitamin D 28.9.
 
Imaging studies suggestive of diverticulitis in the lower left colon (see below)
, & the patient was started on IV Unasyn 3g Q6h.  She was admitted to telemetry.
 Her admission labs were relatively stable, however she bumped up her troponin 
and was given Plavix and IV heparin, in addition to the aspirin.  The patient 
currently looks comfortable, but is receiving narcotic analgesics every 4 hours.
 
2017: Normal TFTs with normal TSH 1.28
 
2017: BCx 2- pending; *no U/A sent
 
2017: WBC 10.4 (83% gran/9 gran Ab), H/H 15.1/45.1, normal MCV 90.3, RDW 
15.6, , *elevated D-dimer 615, PT 11, INR 1.05, PTT 32, glucose 137, BUN/
Cr 15/0.6, GFR > 60, normal electrolytes except bicarbonate 17 with anion gap 17
, normal lactate 1.8, normal amylase/lipase 47/103, Mg 1.8, Ca 9.5, normal LFTs,
including albumin 4.6, globulin 3.2, troponin 0.08, serum hCG negative.
 
2017: troponin .19, .40
 
2017: EKG- SB @ 56, normal axis, borderline prolonged QT interval, QS in 
III
 
2017: EKG- NSR @ 66, old IWMI, prolonged QT interval.
 
2017:  CT ABD & PELVIS W ORAL & IV CONTRAST-
1. Small focus of diverticulitis involving the distal descending colon.  No 
abscess, free air, or perforation.  Normal appendix.  Normal small bowel loops.
2. Status post gastric bypass.
3. Status post cholecystectomy.  CBD 1 cm postop.  No choledocholithiasis.
4.  Stable 2 cm hypodensity left lobe liver, previously felt to be a hemangioma 
(noted on 
2016: sono)
5. Nonobstructive 3 mm stone upper pole of the right kidney.
6.  2.4 cm right adnexal follicular cyst.  No ascites.  Retroverted uterus.
7.  DJD.
 
2017: XRY-CHEST XRAY, PA AND LATERAL-
No acute abnormality.
 
 
Allergies/Medications
Allergies:
Coded Allergies:
No Known Allergies (10/25/16)
 
Home Med List:
Ergocalciferol (Vitamin D2) (Vitamin D2) 50,000 UNIT CAPSULE   1 CAP PO QMON 
SUPPLEMENT  (Reported)
Multivitamin (Multi-Day Vitamins) 1 EACH TABLET   1 TAB PO DAILY SUPPLEMENT-
BARIATRIC  (Reported)
 
Current Medications:
 Current Medications
 
 
  Sig/Bridgett Start time  Last
 
Medication Dose Route Stop Time Status Admin
 
Acetaminophen 650 MG Q6P PRN  1745 AC 
 
  PO   
 
Ampicillin Sodium/ 3,000 MG Q6  1833 AC 
 
Sulbactam Sodium  IV   0619
 
Sodium Chloride 100 ML    
 
Aspirin 81 MG DAILY  1000 AC 
 
  PO   
 
Aspirin 0 .STK-MED ONE  1449 DC 
 
  PO   
 
Aspirin 325 MG ONCE ONE  1445 DC 
 
  PO  1446  1455
 
Clopidogrel Bisulfate 75 MG DAILY  1000 AC 
 
  PO   
 
Enoxaparin Sodium 40 MG DAILY  1743 DC 
 
  SC   2029
 
Heparin Sodium  25,000 UNIT Q24H  0600 AC 
 
(Porcine)  IV   0619
 
Sodium Chloride 500 ML    
 
Heparin Sodium  5,000 UNIT ONCE ONE  0600 DC 
 
(Porcine)  IV  0601  0619
 
Hydromorphone HCl 0 .STK-MED ONE  1930 DC 
 
  .ROUTE   
 
Hydromorphone HCl 0.6 MG Q4P PRN  1900 AC 
 
  IV   0836
 
Metoclopramide HCl 0 .STK-MED ONE  1604 DC 
 
  .ROUTE   
 
Metoclopramide HCl 10 MG ONCE ONE  1600 DC 
 
  IV  1601  1620
 
Morphine Sulfate 0 .STK-MED ONE  1450 DC 
 
  .ROUTE   
 
Morphine Sulfate 2 MG ONCE ONE  1445 DC 
 
  IV  1446  1455
 
Multivitamins  1 TAB DAILY  1000 AC 
 
Therapeutic  PO   
 
Nitroglycerin 0 .STK-MED ONE  1450 DC 
 
  SL   
 
Nitroglycerin 0.4 MG ONCE ONE  1445 DC 
 
  SL  1446  1455
 
Ondansetron HCl 4 MG Q6P PRN  1745 AC 
 
  IV   
 
Ondansetron HCl 4 MG ONCE ONE  1515 DC 
 
  IV  1516  1513
 
Ondansetron HCl 0 .STK-MED ONE  1513 DC 
 
  .ROUTE   
 
Oxycodone/ 1 TAB Q6P PRN  1900 AC 
 
Acetaminophen  PO   
 
Sodium Chloride 1,000 ML CONTINOUS INFUSION  1745 DC 
 
  IV  0704  1948
 
Sodium Chloride 1,000 ML BOLUS ONE  1445 DC 
 
  IV  5907 6060
 
 
 
 
Past History
 
Travel History
Traveled to [place] past 21 day No
 
Medical History
Blood Transfusion Hx: No
Neurological: NONE
EENT: NONE
Cardiovascular: hypertension
Respiratory: ? mild CUATE
Gastrointestinal: POST OP N/V; GJ bypass
Hepatic: hemangioma left lobe liver
Renal: nephrolithiasis (on right w/o hydro; ? type)
Musculoskeletal: degen joint disease
Psychiatric: NONE
Endocrine: vitamin D deficiency
Blood Disorders: NONE
Cancer(s): NONE
GYN/Reproductive: NONE
 
Surgical History
Surgical History: cholecystectomy (10/26/16: lap CCKY), 17: LAP  GJ BYPASS
& LAP HH REPAIR
 
Family History
Relations & Conditions If Any:
FATHER (unknown cause, + EtOH). , Age 60+.
MOTHER (EtOH). Age 60+.
Relation not specified for:
  *No pertinent family history
 
 
Psychosocial History
Where Do You Live? Home
Who Do You Live With? child
Services at Home: None
Primary Language: English
Smoking Status: Never Smoked
ETOH Use: denies use (none recently), occasional use
Illicit Drug Use: denies illicit drug use
Living Will? no
Power of /HCP? no
Other Social History:
Single. Lives with 16 y/o dtr- [employer]. No cigarettes, drugs or EtOH. manager at 
[employer].
 
Functional Ability
ADLs
Independent: dressing, eating, toileting, bathing. 
Ambulation: independent
IADLs
Independent: shopping, housework, finances, food prep, telephone, transportation
, medication admin. 
 
Employment History
Employment: Employed
Profession/Employer: [employer]
 
Review of Systems
Review of Systems:
Full 14 point review of systems otherwise noncontributory, and as above.
 
Review of Systems
Constitutional:
Reports: malaise.  Denies: chills, diaphoresis, fever, weakness, unexplained 
weight loss. 
EENTM:
Denies: blurred vision, double vision, visual changes, eye pain, eye drainage, 
eye tearing, icterus, ear discharge, ear pain, ear redness, hearing changes, 
nasal congestion, epistaxis, nasal pain, throat pain, throat swelling, mouth 
pain, tooth pain. 
Cardiovascular:
Reports: syncope.  Denies: chest pain, edema, orthopena, palpitations, 
peripheral edema. 
Respiratory:
Reports: short of breath (mild).  Denies: cough, hemoptysis, orthopnea, sputum 
production, stridor, wheezing. 
GI:
Reports: abdominal pain (infraumbilical to LLQ), diarrhea, nausea, vomiting.  
Denies: bloating, constipation, distention, bowel incontinence, melena, bloody 
stool, changes in stool, steatorrhea. 
Genitourinary:
Denies: discharge, dysuria, frequency, hematuria, hesitation, nocturia, pain, 
urgency. 
Musculoskeletal:
Reports: joint pain (DJD).  Denies: back pain, gout, joint swelling, muscle pain
, muscle stiffness, neck pain. 
Skin:
Denies: cysts, change in skin color, change in hair/nails, dryness, erythema, 
jaundice, lesions, lymphangitis, lumps, moles, rash. 
Neurological/Psychological:
Denies: anxiety, ataxia, cognitive dysfunction, confusion, depressed, dementia, 
emotional problems, headache, numbness, paresthesia, pre-existing deficit, petit
mal seizures, tingling, tremors, tonic-clonic seizures, unable to move lower ext
, unable to move upper ext, weakness. 
Hematologic/Endocrine:
Denies: bruising, bleeding, polyuria, polydipsia. 
Immunologic/Allergic:
Denies: splenectomy, HIV/AIDS, lymphadenopathy. 
All Other Systems: Reviewed and Negative
 
Exam & Diagnostic Data
Vital Signs and I&O
Vital Signs
 
 
Date Time Temp Pulse Resp B/P B/P Pulse O2 O2 Flow FiO2
 
     Mean Ox Delivery Rate 
 
 0813 98.0 58 20 140/80  97 Room Air  
 
 1955 97.8 96 20 152/86  98   
 
 1906 99.5 68 16 152/80  99 Room Air  
 
 1730 99.2 62 16 162/80  95 Room Air  
 
 1620  57 18 196/79   Room Air  
 
 1506  66 16 145/68     
 
05/ 1445      100 Room Air  
 
 1410 98.7 58 20 175/85  100 Room Air  
 
 
 Intake & Output
 
 
  0400  1600  0400  1600  0400
 
Intake Total 0715 729 3062   
 
Output Total 500     
 
Balance    
 
       
 
Intake,  681 9180   
 
Intake, Oral 600 300    
 
Number 0 0    
 
Bowel      
 
Movements      
 
Output, Urine 500     
 
Patient 238 lb 240 lb 240 lb   
 
Weight      
 
Weight Chair scale Reported by Patient    
 
Measurement      
 
Method      
 
 
 
Physical Exam:
Well-developed, well-nourished, obese right handed female, in minimal distress. 
Sclera anicteric. Conjunctiva pink.  No exophthalmos.  No lid lag.  Oropharynx 
clear. No oral thrush.  No aphthous ulcers.  There is no adenopathy, thyromegaly
, or JVD.  No peripheral stigmata of inflammatory bowel disease or chronic liver
disease on exam.  No spiders on the anterior chest wall. breast & pelvic exams: 
API. No CVA tenderness.  Lungs: clear to A&P, with slight decreased BS at the 
bases B/L.  Heart exam: regular rate rhythm, S1 and S2, without any murmur, rubs
, or gallops.  Abdominal exam: normal bowel sounds, soft obese belly, mild LLQ >
infraumbilical tenderness, without guarding or rebound.  No mass.  No 
organomegaly.  No fluid shift.  No pulsatile mass.  No epigastric bruit.  Old 
scars.  No palpable hernia.  Digital rectal exam: deferred by myself, as OB-
negative API on 
2017. Extremities: without C, C, or E. +DJD.  No palpable cords.  No 
palmar erythema.  No Dupuytren's contractures.  Distal pulses 2+ bilaterally.  
DTRs 2+ bilaterally.  Alert and oriented x 3.  No cogwheeling or tremor on 
Reglan.  No asterixis. CN II-XII intact. Motor 5/5 B/L. 
 
 
Results
Pertinent Lab Results:
 Laboratory Tests
 
 
 
 
 0700 0010 1830
 
Chemistry   
 
  Sodium (137 - 145 mmol/L) 137  
 
  Potassium (3.5 - 5.1 mmol/L) 3.4  L  
 
  Chloride (98 - 107 mmol/L) 106  
 
  Carbon Dioxide (22 - 30 mmol/L) 20  L  
 
  Anion Gap (5 - 16) 11  
 
  BUN (7 - 17 mg/dL) 10  
 
  Creatinine (0.5 - 1.0 mg/dL) 0.5  
 
  Estimated GFR (>60 ml/min) > 60  
 
  BUN/Creatinine Ratio (7 - 25 %) 20.0  
 
  Troponin I (< 0.11 ng/ml) Pending 0.40 *H 0.19 *H
 
 
 
 
 
 
 1422 1421
 
Chemistry  
 
  Sodium (137 - 145 mmol/L) 140 
 
  Potassium (3.5 - 5.1 mmol/L) 3.8 
 
  Chloride (98 - 107 mmol/L) 105 
 
  Carbon Dioxide (22 - 30 mmol/L) 17  L 
 
  Anion Gap (5 - 16) 17  H 
 
  BUN (7 - 17 mg/dL) 15 
 
  Creatinine (0.5 - 1.0 mg/dL) 0.6 
 
  Estimated GFR (>60 ml/min) > 60 
 
  BUN/Creatinine Ratio (7 - 25 %) 25.0 
 
  Glucose (65 - 99 mg/dL) 137  H 
 
  Lactic Acid (0.7 - 2.1 mmol/L) 1.8 
 
  Calcium (8.4 - 10.2 mg/dL) 9.5 
 
  Magnesium (1.6 - 2.3 mg/dL) 1.8 
 
  Total Bilirubin (0.2 - 1.3 mg/dL) 0.7 
 
  Direct Bilirubin (< 0.4 mg/dL) 0.4 
 
  AST (14 - 36 U/L) 18 
 
  ALT (9 - 52 U/L) 35 
 
  Alkaline Phosphatase (<127 U/L) 89 
 
  Troponin I (< 0.11 ng/ml) 0.08 
 
  Total Protein (6.3 - 8.2 g/dL) 7.8 
 
  Albumin (3.5 - 5.0 g/dL) 4.6 
 
  Globulin (1.9 - 4.2 gm/dL) 3.2 
 
  Albumin/Globulin Ratio (1.1 - 2.2 %) 1.4 
 
  Amylase (30 - 110 U/L) 47 
 
  Lipase (23 - 300 U/L) 103 
 
  Total Beta HCG (NEGATIVE) NEGATIVE 
 
Coagulation  
 
  PT (9.4 - 12.5 SEC)  11.0
 
  INR (0.90 - 1.19)  1.05
 
  APTT (25 - 37 SEC)  32
 
  D-Dimer (70 - 232 ng/ml)  615  H
 
Hematology  
 
  CBC w Diff NO MAN DIFF REQ 
 
  WBC (4.8 - 10.8 /CUMM) 10.4 
 
  RBC (4.20 - 5.40 /CUMM) 5.00 
 
  Hgb (12.0 - 16.0 G/DL) 15.1 
 
  Hct (37 - 47 %) 45.1 
 
  MCV (81.0 - 99.0 FL) 90.3 
 
  MCH (27.0 - 31.0 PG) 30.2 
 
  RDW (11.5 - 14.5 %) 15.6  H 
 
  Plt Count (130 - 400 /CUMM) 234 
 
  MPV (7.4 - 10.4 FL) 10.6  H 
 
  Gran % (42.2 - 75.2 %) 82.6  H 
 
  Lymphocytes % (20.5 - 51.1 %) 13.6  L 
 
  Monocytes % (1.7 - 9.3 %) 3.2 
 
  Eosinophils % (0 - 5 %) 0.2 
 
  Basophils % (0.0 - 2.0 %) 0.4 
 
  Absolute Granulocytes (1.4 - 6.5 /CUMM) 8.6  H 
 
  Absolute Lymphocytes (1.2 - 3.4 /CUMM) 1.4 
 
  Absolute Monocytes (0.10 - 0.60 /CUMM) 0.3 
 
  Absolute Eosinophils (0.0 - 0.7 /CUMM) 0 
 
  Absolute Basophils (0.0 - 0.2 /CUMM) 0 
 
  PUBS MCHC (33.0 - 37.0 G/DL) 33.5 
 
 
 
Imaging/Other Studies:
2017: EKG- SB @ 56, normal axis, borderline prolonged QT interval, QS in 
III
 
2017: EKG- NSR @ 66, old IWMI, prolonged QT interval.
 
2017:  CT ABD & PELVIS W ORAL & IV CONTRAST-
1. Small focus of diverticulitis involving the distal descending colon.  No 
abscess, free air, or perforation.  Normal appendix.  Normal small bowel loops.
2. Status post gastric bypass.
3. Status post cholecystectomy.  CBD 1 cm postop.  No choledocholithiasis.
4.  Stable 2 cm hypodensity left lobe liver, previously felt to be a hemangioma 
(noted on 
2016: sono)
5. Nonobstructive 3 mm stone upper pole of the right kidney.
6.  2.4 cm right adnexal follicular cyst.  No ascites.  Retroverted uterus.
7.  DJD.
 
2017: XRY-CHEST XRAY, PA AND LATERAL-
No acute abnormality.
 
 
Assessment/Plan
Assessment/Recommendations:
47-year-old female, hypertensive, non-diabetic, non-hyperlipidemic, mild CUATE, 
DJD, low Vit D, post 
10/26/2016: lap CCKY for cholelithiasis prior to gastric bypass.  2017: 
Lap gastrojejunal bypass & lap HH repair per Dr. Dominguez.  2017: Postop 
UGI series without leak.  The patient reportedly had a preop EGD by her previous
bariatric surgeon, Dr. Herbert, that was reportedly "normal" except for the 
hiatal hernia (I cannot locate the EGD report).  She has not had a colonoscopy. 
The patient's BMI was 46 preoperatively.  She went from 288 pounds to 238 pounds
postop and is 5'8". Aside from both of the patient's parents being alcoholics, 
there is no family history of any inherited liver disease, GI malignancy, or GI 
disease.  The patient claimed a preop Persantine thallium done in 2016 in 
[place] by Dr. Jean was "normal."  
 
The patient went to the [place] ER 2017, with nausea, vomiting, diarrhea, 
infraumbilical abdominal pain, and questionable syncope.  Labs then showed 
normal CBC, normal CMP (except for a mildly decreased bicarbonate), normal 
amylase, normal lipase, troponin negative, serum hCG negative.  2017: CTA 
chest/CT AP with IV/po contrast- suboptimal view of pulmonary arteries, tiny 
opacities RML/RLL (felt to be postinflammatory), left hepatic hemangioma (
previously noted on ultrasound), stable right renal stone without hydronephrosis
, otherwise stable post GJ bypass.  The patient was sent from the ER on Zofran.
 
The patient returned to the [place] ER 2017, depending of similar symptoms
of nausea, vomiting, diarrhea, infraumbilical abdominal pain, and questionable 
syncope.  Her GI symptoms were unrelated to po intake.  She denied any preceding
recent travel, recent antibiotics, NSAID use, aspirin, or raw food ingestion.  
She was advancing her bariatric diet.  She claimed she was  solids and
liquids, to avoid dumping syndrome.  The vomitus was nonbilious, as expected 
post-GJ bypass, and was mostly clear & frothy.  There was no hematemesis or 
melena.  She had early satiety as expected postoperatively.  There was no blood 
or mucus in the stool.  She denied any constipation, obstipation, or tenesmus.  
She denied any rashes or acute arthralgias, aside from her DJD.  She was taking 
bariatric supplements.  She otherwise is on no outpatient medications.  She 
denied any GERD, odynophagia, dysphagia, chest pain, pleuritic pain, or 
hemoptysis.  She had mild shortness of breath.  She denied any fevers, chills, 
jaundice, GYN symptoms, URI symptoms, dysuria, frequency, air in the urine, or 
feces in the urine.  She was recently informed of an elevated T4 in 2017.  
Her infraumbilical pain "6 0 out of 10," seemed to radiate to the LLQ.  She 
claimed she vomited 20 times PTA.  The diarrhea was only a few times.  There was
no history of incontinence.  She is somewhat vague about the syncope.  There was
no LOC or seizure.  She denied any palpitations or focal neurologic deficits.  
She is right-handed.  She reportedly had a normal exam at her bariatric surgeon 
1 week PTA.
 
Upon arrival to the [facility] 2017, a little after 2 PM, /85, P 58,
T 98.7, R 20, O2 sat %.  She was described as pale and dehydrated.  
Fingerstick showed glucose 117.  She was given IV normal saline, morphine, sulfa
, nitroglycerin, aspirin 325 mg, Zofran, Reglan, Lovenox, multivitamin, and 
Tylenol.
 
2017: low normal thiamine 92, normal iron studies with ferritin 73.4, B12 
905, folate 19, low vitamin D 28.9.
 
Imaging studies suggestive of diverticulitis in the lower left colon (see below)
, & the patient was started on IV Unasyn 3g Q6h.  She was admitted to telemetry.
 Her admission labs were relatively stable, however she bumped up her troponin 
and was given Plavix and IV heparin, in addition to the aspirin.  The patient 
currently looks comfortable, but is receiving narcotic analgesics every 4 hours.
 
2017: Normal TFTs with normal TSH 1.28
 
2017: BCx 2- pending; *no U/A sent
 
2017: WBC 10.4 (83% gran/9 gran Ab), H/H 15.1/45.1, normal MCV 90.3, RDW 
15.6, , *elevated D-dimer 615, PT 11, INR 1.05, PTT 32, glucose 137, BUN/
Cr 15/0.6, GFR > 60, normal electrolytes except bicarbonate 17 with anion gap 17
, normal lactate 1.8, normal amylase/lipase 47/103, Mg 1.8, Ca 9.5, normal LFTs,
including albumin 4.6, globulin 3.2, troponin 0.08, serum hCG negative.
 
2017: troponin .19, .40
 
2017: EKG- SB @ 56, normal axis, borderline prolonged QT interval, QS in 
III
 
2017: EKG- NSR @ 66, old IWMI, prolonged QT interval.
 
2017:  CT ABD & PELVIS W ORAL & IV CONTRAST-
1. Small focus of diverticulitis involving the distal descending colon.  No 
abscess, free air, or perforation.  Normal appendix.  Normal small bowel loops.
2. Status post gastric bypass.
3. Status post cholecystectomy.  CBD 1 cm postop.  No choledocholithiasis.
4.  Stable 2 cm hypodensity left lobe liver, previously felt to be a hemangioma 
(noted on 
2016: sono)
5. Nonobstructive 3 mm stone upper pole of the right kidney.
6.  2.4 cm right adnexal follicular cyst.  No ascites.  Retroverted uterus.
7.  DJD.
 
2017: XRY-CHEST XRAY, PA AND LATERAL-
No acute abnormality.
 
*The patient has mild lower left colon diverticulitis by admission CT.  Her GJ 
bypass is intact.  Her nausea, vomiting and mild diarrhea could be postop in 
nature and adaptation to her bariatric diet.  Rule out dumping syndrome.  Rule 
out gastroenteritis.  Doubt bacterial enteritis.  Doubt IBD.  Doubt marginal 
ulcer postop, but possible.  Her elevated troponins are noted.  It is possible 
the nausea could be an anginal equivalent, especially with the rising troponins 
and elevated d-dimer.  Please note, 2017: CTA chest- negative for PE, but 
somewhat limited views of pulmonary arteries She recently had an elevated T4, 
but subsequent TFTs were normal.  Her LLQ symptoms certainly could be from her 
mild diverticulitis. 
 
SUGGEST:
Clears po. Panculture. Continue IV Unasyn 3g Q6h. analgesics as needed.  Reglan 
as needed.  Zofran as needed.  Syncope workup as per medical team.  Advise 
cardiology consult.  Stools are OB-negative.  Will defer to cardiology regarding
continuing anticoagulation and antiplatelet agents.
DVT prophylaxis.  Consider bariatric input.  Would check trace elements & repeat
thiamine level, in view of recent GJ bypass and syncope.  Replete potassium.  IV
fluids.  When diet is eventually advanced to a bariatric, low residue diet, 
continue to separate solids and liquids, to avoid dumping syndrome postop.  Send
stool for culture, Shiga toxin, & C. difficile.  Once the patient's cardiac 
issues and syncope are worked up, the patient should have a baseline colonoscopy
in 6-8 weeks (i.e.- 2017), to clear her colonic mucosa of any colitis or 
lesions.  Further recommendations will follow, depending on clinical course.
Problem List:
 1. Diverticulitis
 
 2. Nausea and vomiting in adult
 
 3. Abdominal pain
 
 4. Diarrhea
 
 5. Gastric bypass status for obesity
 
 6. Liver hemangioma
 
 7. Morbid obesity
 
 8. Elevated troponin
 
Copies To:
MILVIA DOMINGUEZ DO; DEMETRIUS LOPEZ,ELEANOR; MARC LOPEZ,REBECCA LAINEZ; HUMERA LOPEZ,DESIRAE
 
Consult Acknowledgment
- Thank you for your consult request.

## 2017-05-03 NOTE — PN- HOUSESTAFF
SUNSHINE TRINH 17 0802:
Subjective
Follow-up For:
- Abdominal discomfort
- Nausea, vomiting, diarrhe
- elevated troponins
 
Complaints: no complaints
Tele-Events Since Last Visit:
NSR, HR 59- 66 bpm, no overnight events. 
Subjective:
Pt was comfortable. No complaints. Abdominal pain subsided this am. No nausea or
vomiting. Vitals remained stable. No chest pain, shortness of breath. 
 
Review of Systems
Constitutional:
Reports: see HPI. 
 
Objective
Last 24 Hrs of Vital Signs/I&O
 Vital Signs
 
 
Date Time Temp Pulse Resp B/P B/P Pulse O2 O2 Flow FiO2
 
     Mean Ox Delivery Rate 
 
 1955 97.8 96 20 152/86  98   
 
05 1906 99.5 68 16 152/80  99 Room Air  
 
 1730 99.2 62 16 162/80  95 Room Air  
 
 1620  57 18 196/79   Room Air  
 
 1506  66 16 145/68     
 
05 1445      100 Room Air  
 
 1410 98.7 58 20 175/85  100 Room Air  
 
 
 Intake & Output
 
 
  1600  0800  0000
 
Intake Total  1250 550
 
Output Total  500 
 
Balance  750 550
 
    
 
Intake, IV  650 250
 
Intake, Oral  600 300
 
Number  0 0
 
Bowel   
 
Movements   
 
Output, Urine  500 
 
Patient  238 lb 240 lb
 
Weight   
 
Weight  Chair scale Reported by Patient
 
Measurement   
 
Method   
 
 
 
 
Physical Exam
General Appearance: No Acute Distress
Other Physical Findings:
General Appearance- Alert, Oriented X3, Cooperative, No Acute Distress, Mild 
Distress
Skin No Rashes, No Breakdow
HEENT Atraumatic, PERRLA, EOMI
Neck Supple, No JVD, No thryomegaly
Lymphatic Axillary nl, Cervical nl
Cardiovascular Regular Rate, Normal S1, Normal S2, No Murmurs
Lungs Normal Air Movement
Abdomen Normal Bowel Sounds, Soft, No Hepatospenomegaly (epigastric tenderness)
Neurological Normal Speech, Strength at 5/5 X4 Ext, Normal Tone, Sensation 
Intact, Cranial Nerves 3-12 NL, Reflexes 2+
Extremities No Cyanosis, No Edema, Normal Pulses, No Tenderness/Swelling
Vascular Pulses Symmetrical
Current Medications:
 Current Medications
 
 
  Sig/Bridgett Start time  Last
 
Medication Dose Route Stop Time Status Admin
 
Acetaminophen 650 MG Q6P PRN  1745 AC 
 
  PO   
 
Ampicillin Sodium/ 3,000 MG Q6  1833 AC 
 
Sulbactam Sodium  IV   0619
 
Sodium Chloride 100 ML    
 
Aspirin 81 MG DAILY 05 1000 AC 
 
  PO   
 
Aspirin 0 .STK-MED ONE  1449 DC 
 
  PO   
 
Aspirin 325 MG ONCE ONE  1445 DC 05/
 
  PO  1446  1455
 
Clopidogrel Bisulfate 75 MG DAILY  1000 AC 
 
  PO   
 
Enoxaparin Sodium 40 MG DAILY 05/ 1743 DC 05/
 
  SC   2029
 
Heparin Sodium  25,000 UNIT Q24H  0600 AC 
 
(Porcine)  IV   0619
 
Sodium Chloride 500 ML    
 
Heparin Sodium  5,000 UNIT ONCE ONE  0600 DC 
 
(Porcine)  IV  0601  0619
 
Hydromorphone HCl 0 .STK-MED ONE  1930 DC 
 
  .ROUTE   
 
Hydromorphone HCl 0.6 MG Q4P PRN  1900 AC 
 
  IV   0409
 
Metoclopramide HCl 0 .STK-MED ONE  1604 DC 
 
  .ROUTE   
 
Metoclopramide HCl 10 MG ONCE ONE  1600 DC 
 
  IV  1601  1620
 
Morphine Sulfate 0 .STK-MED ONE  1450 DC 
 
  .ROUTE   
 
Morphine Sulfate 2 MG ONCE ONE  1445 DC 
 
  IV  1446  1455
 
Multivitamins  1 TAB DAILY  1000 AC 
 
Therapeutic  PO   
 
Nitroglycerin 0 .STK-MED ONE  1450 DC 
 
  SL   
 
Nitroglycerin 0.4 MG ONCE ONE  1445 DC 
 
  SL  1446  1455
 
Ondansetron HCl 4 MG Q6P PRN  1745 AC 
 
  IV   
 
Ondansetron HCl 4 MG ONCE ONE  1515 DC 05
 
  IV  1516  1513
 
Ondansetron HCl 0 .STK-MED ONE  1513 DC 
 
  .ROUTE   
 
Oxycodone/ 1 TAB Q6P PRN  1900 AC 
 
Acetaminophen  PO   
 
Sodium Chloride 1,000 ML CONTINOUS INFUSION  1745 DC /
 
  IV  0704  1948
 
Sodium Chloride 1,000 ML BOLUS ONE  1445 DC 
 
  IV  1544  1430
 
 
 
 
Last 24 Hrs of Lab/Devaughn Results
Last 24 Hrs of Labs/Mics:
 Laboratory Tests
 
17 0700:
Sodium Pending, Potassium Pending, Chloride Pending, Carbon Dioxide Pending, 
Anion Gap Pending, BUN Pending, Creatinine Pending, BUN/Creatinine Ratio Pending
, Troponin I Pending
 
17 0010:
Troponin I 0.40 *H
 
17 1830:
Troponin I 0.19 *H
 
17 1422:
Anion Gap 17  H, Estimated GFR > 60, BUN/Creatinine Ratio 25.0, Glucose 137  H, 
Lactic Acid 1.8, Calcium 9.5, Magnesium 1.8, Total Bilirubin 0.7, Direct 
Bilirubin 0.4, AST 18, ALT 35, Alkaline Phosphatase 89, Troponin I 0.08, Total 
Protein 7.8, Albumin 4.6, Globulin 3.2, Albumin/Globulin Ratio 1.4, Amylase 47, 
Lipase 103, Total Beta HCG NEGATIVE, CBC w Diff NO MAN DIFF REQ, RBC 5.00, MCV 
90.3, MCH 30.2, RDW 15.6  H, MPV 10.6  H, Gran % 82.6  H, Lymphocytes % 13.6  L,
Monocytes % 3.2, Eosinophils % 0.2, Basophils % 0.4, Absolute Granulocytes 8.6  
H, Absolute Lymphocytes 1.4, Absolute Monocytes 0.3, Absolute Eosinophils 0, 
Absolute Basophils 0, PUBS MCHC 33.5
 
17 1421:
PT 11.0, INR 1.05, APTT 32, D-Dimer 615  H
 Microbiology
2050  BLOOD: Blood Culture - RECD
2040  BLOOD: Blood Culture - RECD
 175  URINE ROUT: Urine Culture - COLB
 
 
 
Assessment/Plan
Assessment:
 
Ms Taveras is a 47-year-old woman  who has a past medical history of hypertension, 
obesity (gastric bypass surgery done recently).  She is being evaluated of near 
loss of consciousness 1 day.
 
At the time of admission, vitals 98.7, respiration 20, pulse rate 58, blood 
pressure 175/85 (improved later ), pulse ox 100% on room air.  Findings 
indicated WBC 10.4, hemoglobin 15.1, platelets 234, normal electrolytes-sodium 
140, potassium 3.8, anion gap 17, bicarbonate 17, BUN 15.  Creatinine 0.6, 
lactic acid 1.8, d-dimer 615, EKG revealed normal sinus rhythm, heart rate 51, 
slightly elevated T waves.
 
Radiological findings-chest x-ray negative for any acute process.  CT abdomen 
and pelvis with oral and IV contrast revealed small focus of diverticulitis 
involving the distal descending colon.  Also was found a nonobstructing 3 mm 
stone in the upper pole of the right kidney (chronic as per the patient).
 
Below is the problem list and plan:
 
#1 nausea, vomiting, diarrhea-likely, common etiology is viral gastroenteritis. 
Since the patient underwent gastric bypass, acute changes in microbiota could 
possibly cause diarrhea. Also has diverticultitis, for which she is started on 
Unasyn. Bowel rest for now. GI consult.  Metabolic abnormalities resulting from 
vomiting and diarrhea are being corrected. 
 
#2 hypertension- Currently off medications.
 
#3 presyncope-monitor the patient on telemetry.  Monitor for arrhythmias, heart 
block.  May benefit from an echocardiogram at this time.  Check orthostatic 
vitals. Elevated troponins w/ non specific T wave changes. Initially started on 
iv heparin, which was discontinued after speaking to Dr. Jean. Pt was already
given aspirin, but plan to start on plavix in view of gastric bypass and 
increase incidence of ulcers. Discussed w/ Dr Frausto who is agreeable to have 
the pt on plavix. 
 
#4 DVT prophylaxis-pharmacological.
Problem List:
 1. Elevated troponin
 
 2. Gastric bypass status for obesity
 
Pain Ratin
Pain Location:
abdomein
Pain Goal: Pain 4 or less
Pain Plan:
dilpeter
Tomorrow's Labs & Rationales:
cbc
epip
 
 
DAISY LOPEZ,NOHEMI 17 1250:
Attending MD Review Statement
 
Attending Statement
Attending MD Statement: examined this patient, discuss w/resident/PA/NP, agreed 
w/resident/PA/NP, reviewed EMR data (avail), discussed with nursing, discussed 
with case mgmt, reviewed images, amended to note
Attending Assessment/Plan:
Patient seen and examined, feels okay.  She underwent chest CT and at that point
she was feeling slightly dizzy.  Her heart rate has been running in the low side
in 50s.  Does feel nauseous this morning but no vomiting.  Diarrhea has 
improved.
 
Vital Signs
 
 
Date Time Temp Pulse Resp B/P B/P Pulse O2 O2 Flow FiO2
 
     Mean Ox Delivery Rate 
 
 0813 98.0 58 20 140/80  97 Room Air  
 
 1955 97.8 96 20 152/86  98   
 
 1906 99.5 68 16 152/80  99 Room Air  
 
 1730 99.2 62 16 162/80  95 Room Air  
 
 1620  57 18 196/79   Room Air  
 
 1506  66 16 145/68     
 
 1445      100 Room Air  
 
 1410 98.7 58 20 175/85  100 Room Air  
 
 
on exam; 
aox3, nad. 
cv; s1,s2, rrr
resp; clear
abd; soft, tender in the left side, bowel sounds positive.
ext; no edema. 
 
 Laboratory Tests
 
 
 
 
 1150 0700 0010 1830
 
Chemistry    
 
  Sodium (137 - 145 mmol/L)  137  
 
  Potassium (3.5 - 5.1 mmol/L)  3.4  L  
 
  Chloride (98 - 107 mmol/L)  106  
 
  Carbon Dioxide (22 - 30 mmol/L)  20  L  
 
  Anion Gap (5 - 16)  11  
 
  BUN (7 - 17 mg/dL)  10  
 
  Creatinine (0.5 - 1.0 mg/dL)  0.5  
 
  Estimated GFR (>60 ml/min)  > 60  
 
  BUN/Creatinine Ratio (7 - 25 %)  20.0  
 
  Troponin I (< 0.11 ng/ml)  0.22 *H 0.40 *H 0.19 *H
 
Coagulation    
 
  APTT Pending   
 
 
 
 
 
 
 1422 1421
 
Chemistry  
 
  Sodium (137 - 145 mmol/L) 140 
 
  Potassium (3.5 - 5.1 mmol/L) 3.8 
 
  Chloride (98 - 107 mmol/L) 105 
 
  Carbon Dioxide (22 - 30 mmol/L) 17  L 
 
  Anion Gap (5 - 16) 17  H 
 
  BUN (7 - 17 mg/dL) 15 
 
  Creatinine (0.5 - 1.0 mg/dL) 0.6 
 
  Estimated GFR (>60 ml/min) > 60 
 
  BUN/Creatinine Ratio (7 - 25 %) 25.0 
 
  Glucose (65 - 99 mg/dL) 137  H 
 
  Lactic Acid (0.7 - 2.1 mmol/L) 1.8 
 
  Calcium (8.4 - 10.2 mg/dL) 9.5 
 
  Magnesium (1.6 - 2.3 mg/dL) 1.8 
 
  Total Bilirubin (0.2 - 1.3 mg/dL) 0.7 
 
  Direct Bilirubin (< 0.4 mg/dL) 0.4 
 
  AST (14 - 36 U/L) 18 
 
  ALT (9 - 52 U/L) 35 
 
  Alkaline Phosphatase (<127 U/L) 89 
 
  Troponin I (< 0.11 ng/ml) 0.08 
 
  Total Protein (6.3 - 8.2 g/dL) 7.8 
 
  Albumin (3.5 - 5.0 g/dL) 4.6 
 
  Globulin (1.9 - 4.2 gm/dL) 3.2 
 
  Albumin/Globulin Ratio (1.1 - 2.2 %) 1.4 
 
  Amylase (30 - 110 U/L) 47 
 
  Lipase (23 - 300 U/L) 103 
 
  Total Beta HCG (NEGATIVE) NEGATIVE 
 
Coagulation  
 
  PT (9.4 - 12.5 SEC)  11.0
 
  INR (0.90 - 1.19)  1.05
 
  APTT (25 - 37 SEC)  32
 
  D-Dimer (70 - 232 ng/ml)  615  H
 
Hematology  
 
  CBC w Diff NO MAN DIFF REQ 
 
  WBC (4.8 - 10.8 /CUMM) 10.4 
 
  RBC (4.20 - 5.40 /CUMM) 5.00 
 
  Hgb (12.0 - 16.0 G/DL) 15.1 
 
  Hct (37 - 47 %) 45.1 
 
  MCV (81.0 - 99.0 FL) 90.3 
 
  MCH (27.0 - 31.0 PG) 30.2 
 
  RDW (11.5 - 14.5 %) 15.6  H 
 
  Plt Count (130 - 400 /CUMM) 234 
 
  MPV (7.4 - 10.4 FL) 10.6  H 
 
  Gran % (42.2 - 75.2 %) 82.6  H 
 
  Lymphocytes % (20.5 - 51.1 %) 13.6  L 
 
  Monocytes % (1.7 - 9.3 %) 3.2 
 
  Eosinophils % (0 - 5 %) 0.2 
 
  Basophils % (0.0 - 2.0 %) 0.4 
 
  Absolute Granulocytes (1.4 - 6.5 /CUMM) 8.6  H 
 
  Absolute Lymphocytes (1.2 - 3.4 /CUMM) 1.4 
 
  Absolute Monocytes (0.10 - 0.60 /CUMM) 0.3 
 
  Absolute Eosinophils (0.0 - 0.7 /CUMM) 0 
 
  Absolute Basophils (0.0 - 0.2 /CUMM) 0 
 
  PUBS MCHC (33.0 - 37.0 G/DL) 33.5 
 
 
A/P; 47 F with pmh sig for morbid obesity s/p laparoscopic gastric bypass on  with hiatal hernia repair, who lost about 50 pounds post surgery, 
history of hypertension, GERD, admitted with abdominal pain, nausea, vomiting, 
diarrhea secondary to acute diverticulitis.  Patient also had positive troponins
likely secondary to demand ischemia.
 
CT negative for pulmonary him resume.  Heparin drip will be discontinued.  
Patient will be kept on antiplatelet therapy.  Cardiology consult will be 
obtained.  We'll keep him on clear liquid diet and advance as tolerated.  Per GI
, will try to separate solids from the liquid to avoid dumping syndrome 
postoperatively.  She had a recent bariatric surgery done.  Continue 
antibiotics.  If she has diarrhea, or stool studies should be sent.
Appreciate GI input.
Surgical PA discuss with me and they are recommending keeping the patient on a 
PPI.  Please start the patient on 40 mg of either Prilosec or Protonix to be 
taken in the early morning while she is on antiplatelets.
DVT Px; Can start lovenox once hep gtt is discontinued.

## 2017-05-03 NOTE — CONS- CARDIOLOGY
General Information and HPI
 
Consulting Request
Date of Consult: 17
Requested By:
NOHEMI VILLA MD
 
Reason for Consult:
Elevated troponin
Source of Information: patient, old records
History of Present Illness:
Ms Taveras is a 47-year-old woman who is known to me from previous office visits 
and from her recent preoperative clearance for her bariatric surgery.  She has 
been stable from a cardiac standpoint.  In preparation for bariatric surgery she
had an echocardiogram which was normal and a pharmacologic nuclear stress test 
which was also normal.  These were both performed within the last 4 months.  She
underwent uneventful gastric bypass surgery in February.  
She was in her usual state of health until 1 day ago.  She has a past medical 
history of hypertension, obesity (gastric bypass surgery done approximately one 
year ago).  She came to the ER for evaluation of near loss of consciousness 1 
day.
 
As per the patient, she felt normal when she woke up this a.m., and experienced 
nausea a few hours after.  Reports several episodes of vomiting 20 times, 
nonbilious, nonbloody, frothy.  Also reported several episodes of diarrhea 
associated with abdominal pain.  Pain 6/10, located around the umbilicus, which 
radiated into the epigastric region.  No fever, cough, change in by mouth intake
, history of food contamination, foreign travel.  No shortness of breath, chest 
pain, palpitations, neurological weakness, loss of bladder or bowel function.  
Reported similar symptoms 3 weeks ago, which subsided without any medical 
intervention.
 
The patient also noted some mid abdominal discomfort radiating to the epigastric
area.  After the vomiting, this radiated to the mid sternal area as well.  No 
other cardiac symptoms were noted.  Today, the patient is feeling well.  She 
denies any recurrence of any symptoms.  She notes that she had a similar episode
of symptoms about one month ago.
 
 
Allergies/Medications
Allergies:
Coded Allergies:
No Known Allergies (10/25/16)
 
Home Med List:
Ergocalciferol (Vitamin D2) (Vitamin D2) 50,000 UNIT CAPSULE   1 CAP PO QMON 
SUPPLEMENT  (Reported)
Multivitamin (Multi-Day Vitamins) 1 EACH TABLET   1 TAB PO DAILY SUPPLEMENT-
BARIATRIC  (Reported)
 
 
Past History
 
Travel History
Traveled to [place] past 21 day No
 
Medical History
Blood Transfusion Hx: No
Neurological: NONE
EENT: NONE
Cardiovascular: hypertension
Respiratory: ? mild CUATE
Gastrointestinal: POST OP N/V; GJ bypass
Hepatic: hemangioma left lobe liver
Renal: nephrolithiasis (on right w/o hydro; ? type)
Musculoskeletal: degen joint disease
Psychiatric: NONE
Endocrine: vitamin D deficiency
Blood Disorders: NONE
Cancer(s): NONE
GYN/Reproductive: NONE
 
Surgical History
Surgical History: cholecystectomy (10/26/16: lap CCKY), 17: LAP  GJ BYPASS
& LAP HH REPAIR
 
Family History
Relations & Conditions If Any:
FATHER (unknown cause, + EtOH). , Age 60+.
MOTHER (EtOH). Age 60+.
Relation not specified for:
  *No pertinent family history
 
 
Psychosocial History
Where Do You Live? Home
Who Do You Live With? child
Services at Home: None
Primary Language: English
Smoking Status: Never Smoked
ETOH Use: denies use (none recently), occasional use
Illicit Drug Use: denies illicit drug use
Living Will? no
Power of /HCP? no
Other Social History:
Single. Lives with 16 y/o dtr- [employer]. No
 cigarettes, drugs or EtOH. manager at
 [employer].
 
Functional Ability
ADLs
Independent: dressing, eating, toileting, bathing. 
Ambulation: independent
IADLs
Independent: shopping, housework, finances, food prep, telephone, transportation
, medication admin. 
 
Employment History
Employment: Employed
Profession/Employer [employer]
 
Exam & Diagnostic Data
Vital Signs and I&O
Vital Signs
 
 
Date Time Temp Pulse Resp B/P B/P Pulse O2 O2 Flow FiO2
 
     Mean Ox Delivery Rate 
 
813 98.0 58 20 140/80  97 Room Air  
 
 1955 97.8 96 20 152/86  98   
 
 1906 99.5 68 16 152/80  99 Room Air  
 
 1730 99.2 62 16 162/80  95 Room Air  
 
 1620  57 18 196/79   Room Air  
 
 1506  66 16 145/68     
 
 1445      100 Room Air  
 
 1410 98.7 58 20 175/85  100 Room Air  
 
 
 Intake & Output
 
 
  1600  0800  0000  1600  08 0000
 
Intake Total  3257 251 3747  
 
Output Total  500    
 
Balance    
 
       
 
Intake, IV    
 
Intake, Oral  600 300   
 
Number  0 0   
 
Bowel      
 
Movements      
 
Output, Urine  500    
 
Patient  238 lb 240 lb 240 lb  
 
Weight      
 
Weight  Chair scale Reported by Patient   
 
Measurement      
 
Method      
 
 
 
Labs/Devaughn Results:
 Laboratory Tests
 
 
 
 
 1150 1013 0700 0010 1830
 
Chemistry     
 
  Sodium (137 - 145 mmol/L)   137  
 
  Potassium (3.5 - 5.1 mmol/L)   3.4  L  
 
  Chloride (98 - 107 mmol/L)   106  
 
  Carbon Dioxide (22 - 30 mmol/L)   20  L  
 
  Anion Gap (5 - 16)   11  
 
  BUN (7 - 17 mg/dL)   10  
 
  Creatinine (0.5 - 1.0 mg/dL)   0.5  
 
  Estimated GFR (>60 ml/min)   > 60  
 
  BUN/Creatinine Ratio (7 - 25 %)   20.0  
 
  Troponin I (< 0.11 ng/ml)   0.22 *H 0.40 *H 0.19 *H
 
Coagulation     
 
  APTT Pending    
 
Urines     
 
  Urine Color  Pending   
 
  Urine Clarity  Pending   
 
  Urine pH  Pending   
 
  Ur Specific Gravity  Pending   
 
  Urine Protein  Pending   
 
  Urine Ketones  Pending   
 
  Urine Nitrite  Pending   
 
  Urine Bilirubin  Pending   
 
  Urine Urobilinogen  Pending   
 
  Ur Leukocyte Esterase  Pending   
 
  Ur Microscopic  Pending   
 
  Urine Hemoglobin  Pending   
 
  Urine Glucose  Pending   
 
 
 
 
 
 
 1422 1421
 
Chemistry  
 
  Sodium (137 - 145 mmol/L) 140 
 
  Potassium (3.5 - 5.1 mmol/L) 3.8 
 
  Chloride (98 - 107 mmol/L) 105 
 
  Carbon Dioxide (22 - 30 mmol/L) 17  L 
 
  Anion Gap (5 - 16) 17  H 
 
  BUN (7 - 17 mg/dL) 15 
 
  Creatinine (0.5 - 1.0 mg/dL) 0.6 
 
  Estimated GFR (>60 ml/min) > 60 
 
  BUN/Creatinine Ratio (7 - 25 %) 25.0 
 
  Glucose (65 - 99 mg/dL) 137  H 
 
  Lactic Acid (0.7 - 2.1 mmol/L) 1.8 
 
  Calcium (8.4 - 10.2 mg/dL) 9.5 
 
  Magnesium (1.6 - 2.3 mg/dL) 1.8 
 
  Total Bilirubin (0.2 - 1.3 mg/dL) 0.7 
 
  Direct Bilirubin (< 0.4 mg/dL) 0.4 
 
  AST (14 - 36 U/L) 18 
 
  ALT (9 - 52 U/L) 35 
 
  Alkaline Phosphatase (<127 U/L) 89 
 
  Troponin I (< 0.11 ng/ml) 0.08 
 
  Total Protein (6.3 - 8.2 g/dL) 7.8 
 
  Albumin (3.5 - 5.0 g/dL) 4.6 
 
  Globulin (1.9 - 4.2 gm/dL) 3.2 
 
  Albumin/Globulin Ratio (1.1 - 2.2 %) 1.4 
 
  Amylase (30 - 110 U/L) 47 
 
  Lipase (23 - 300 U/L) 103 
 
  Total Beta HCG (NEGATIVE) NEGATIVE 
 
Coagulation  
 
  PT (9.4 - 12.5 SEC)  11.0
 
  INR (0.90 - 1.19)  1.05
 
  APTT (25 - 37 SEC)  32
 
  D-Dimer (70 - 232 ng/ml)  615  H
 
Hematology  
 
  CBC w Diff NO MAN DIFF REQ 
 
  WBC (4.8 - 10.8 /CUMM) 10.4 
 
  RBC (4.20 - 5.40 /CUMM) 5.00 
 
  Hgb (12.0 - 16.0 G/DL) 15.1 
 
  Hct (37 - 47 %) 45.1 
 
  MCV (81.0 - 99.0 FL) 90.3 
 
  MCH (27.0 - 31.0 PG) 30.2 
 
  RDW (11.5 - 14.5 %) 15.6  H 
 
  Plt Count (130 - 400 /CUMM) 234 
 
  MPV (7.4 - 10.4 FL) 10.6  H 
 
  Gran % (42.2 - 75.2 %) 82.6  H 
 
  Lymphocytes % (20.5 - 51.1 %) 13.6  L 
 
  Monocytes % (1.7 - 9.3 %) 3.2 
 
  Eosinophils % (0 - 5 %) 0.2 
 
  Basophils % (0.0 - 2.0 %) 0.4 
 
  Absolute Granulocytes (1.4 - 6.5 /CUMM) 8.6  H 
 
  Absolute Lymphocytes (1.2 - 3.4 /CUMM) 1.4 
 
  Absolute Monocytes (0.10 - 0.60 /CUMM) 0.3 
 
  Absolute Eosinophils (0.0 - 0.7 /CUMM) 0 
 
  Absolute Basophils (0.0 - 0.2 /CUMM) 0 
 
  PUBS MCHC (33.0 - 37.0 G/DL) 33.5 
 
 
 
 
Diagnostic Data
EKG Results
Sinus bradycardia; insignificant inferior Q waves; no change from old EKG.
Other Results
Abdominal/pelvic CT scan:
IMPRESSION:
 
1. Small focus of diverticulitis involving the distal descending colon.
2. Status post gastric bypass.
3. Status post cholecystectomy.
4. Nonobstructive 3 mm stone upper pole of the right kidney.
 
 
Assessment/Plan
Assessment/Plan
Assessment:
1.  Abdominal symptoms of nausea, vomiting, and diarrhea with evidence of 
diverticulitis on CT scan
2.  Minimally elevated troponin-peak 0.4.  At the moment, I do not see any 
evidence of any acute ischemic event.  The patient has no symptoms to suggest 
cardiac disease.  Her chest CT shows no evidence of coronary calcification.  Her
echocardiogram are ECGs are unremarkable.  Her recent evaluation including an 
echocardiogram and pharmacologic nuclear stress test were both normal.
3.  History of hypertension
4.  Status post recent gastric bypass surgery
 
Recommendations:
-Keep on telemetry for now.
-Discontinue IV heparin
-Continue Plavix or aspirin 81 if OK with bariatric surgery.
-Echocardiogram pending
-Further plans after the above.
 
 
 
Consult Acknowledgment
- Thank you for your consult request.

## 2017-05-03 NOTE — CT SCAN REPORT
EXAMINATION:
CT ANGIOGRAM OF THE CHEST WITH CONTRAST (CT PULMONARY ANGIOGRAM FOR PE)
 
CLINICAL INFORMATION:
Shortness of breath. Elevated D-dimer.
 
COMPARISON:
03/28/2017.
 
TECHNIQUE:
Prior to contrast administration, noncontrast localization images were
obtained. Subsequently, multidetector volumetric imaging was performed from
the thoracic inlet to below the diaphragms following the administration of 94
mL of Optiray 350 intravenous contrast. No contrast reaction reported.
Sagittal, coronal, and MIP oblique sagittal reformatted images were obtained
on the CT workstation, uploaded to PACS, and reviewed.
 
Total exam dose-length product 630 mGy-cm
 
FINDINGS:
 
QUALITY OF STUDY/CONTRAST BOLUS: Satisfactory.
 
PULMONARY ARTERIES: No filling defects are identified in the main, lobar or
segmental vessels.
 
THORACIC AORTA: Thoracic aorta is normal in caliber; no acute intramural
hematoma, aneurysm or dissection.
 
LUNGS AND PLEURA: Trachea and central airways are widely patent and normal in
caliber. There are a few linear opacities of minimal atelectasis in the lower
lobes. No pulmonary consolidation, edema, pneumothorax or pleural effusion.
No interval development of a suspicious lung nodule or mass.
 
MEDIASTINUM: Normal heart size.  No pericardial effusion.   No evidence of
septal bowing or right heart strain. The esophagus is unremarkable. The
visualized portion of the thyroid gland is normal.
 
LYMPHATICS: No axillary, hilar, mediastinal or internal mammary
lymphadenopathy.
 
UPPER ABDOMEN: Gallbladder is surgically absent. A stable, 1.8 cm hypodense
lesion within the left hepatic lobe corresponds to the hyperechoic focus on
ultrasound of 04/25/2016 (consistent with cavernous hemangioma).  No reflux
of contrast into the hepatic veins to suggest elevated right heart pressures.
Surgical changes from prior gastric bypass.
 
OSSEOUS STRUCTURES: Dextroscoliosis of the thoracic spine. No aggressive bone
lesions.
 
IMPRESSION:
No evidence of pulmonary embolism.

## 2017-05-03 NOTE — EVENT NOTE
Event Note
Event Note:
We were asked by am team to F/U troponin level and EKG at 12 midnight, patient 
has positive troponin of 6:30 pm of 0.19 with no EKG changes repeated EKG shows 
troponin of 0.4, with no EKG changes, I spoke to oncall Cardiologist  
regarding elevated troponin, recommendation was to start patient on aspirin, 
plavix and iv heparin. She already received 325mg ASA at ED, will give 81mg ASA 
beside iv heparin and plavix.

## 2017-05-04 VITALS — DIASTOLIC BLOOD PRESSURE: 82 MMHG | SYSTOLIC BLOOD PRESSURE: 152 MMHG

## 2017-05-04 VITALS — DIASTOLIC BLOOD PRESSURE: 70 MMHG | SYSTOLIC BLOOD PRESSURE: 132 MMHG

## 2017-05-04 VITALS — SYSTOLIC BLOOD PRESSURE: 130 MMHG | DIASTOLIC BLOOD PRESSURE: 78 MMHG

## 2017-05-04 NOTE — NUR
FINGERSTICK AT 0700 WAS 60.  PATIENT REPORTS FEELING "DIZZY AND SHAKY"  DR. GILLESPIE NOTIFIED AND 1/2 AMP D50 GIVEN IV AS ORDERED.  RECHECK FINGERSTICK 118.

## 2017-05-04 NOTE — ECHOCARDIOGRAM REPORT
ROMINA SLAUGHTER 
 
 Age:    47     :    1970      Gender:     F 
 
 MRN:    [id number] 
 
 Exam Date:     2017  
                10:41 
 
 Exam Location: 1  
 [place] 
 
 Ht (in):     68      Wt (lb):      236     BSA:    2.31 
 
 BP:          130     /     78 
 
 Ordering Physician:        EMILIANO BOYD MD 
 
 Referring Physician:       EMILIANO BOYD MD 
 
 Technologist:              [facility] 
 
 Room Number:               187-1 
 
 Indications:       Shortness of breath 
 
 Rhythm:                 Sinus 
 
 Technical Quality:      Good 
 
 FINDINGS 
 
 Left Ventricle 
 Normal global left ventricular size, wall thickness, systolic  
 function with no obvious regional wall motion abnormalities. Normal  
 left ventricular ejection fraction estimated at 60-65%. 
 
 Right Ventricle 
 Normal right ventricular size and function. 
 
 Right Atrium 
 Normal right atrial size. 
 
 Left Atrium 
 Left atrial size at the upper limits of normal. 
 
 Mitral Valve 
 Structurally normal mitral valve. Trace to mild mitral  
 regurgitation. 
 
 Aortic Valve 
 Structurally normal trileaflet aortic valve. No aortic stenosis. No  
 aortic regurgitation. 
 
 Tricuspid Valve 
 Tricuspid valve not well visualized, grossly normal. Trace to mild  
 tricuspid regurgitation. 
 
 Pulmonic Valve 
 Structurally normal pulmonic valve. Trace pulmonic regurgitation. 
 
 Pericardium 
 No pericardial effusion. 
 
 Great Vessels 
 Normal size aortic root and proximal ascending aorta. 
 
 CONCLUSIONS 
 1. The aortic valve is trileafle and normal. 
 2. The mitral valve is anatomically normal with minimal to mild  
 mitral insufficiency. 
 3. There is no pericardial fluid present. 
 4. The left ventricular chamber size and systolic function are  
 normal. 
 6. Minimal to mild tricuspid and pulmonic insufficiency are present  
 with no evidence of pulmonary hypertension. 
 
 SAÚL Jean M.D. 
 (Electronically Signed) 
 Final Date:      04 May 2017 19:50 
 
 MEASUREMENTS  (Male / Female) Normal Values 
 
 2D ECHO 
 LV Diastolic Diameter PLAX        4.8 cm                4.2 - 5.9 / 3.9 - 5.3 
cm 
 LV Systolic Diameter PLAX         2.9 cm                2.1 - 4.0 cm 
 LV Fractional Shortening PLAX     39.6 %                25 - 46  % 
 LV Ejection Fraction 2D Teich     70.0 %                 
 IVS Diastolic Thickness           0.9 cm                 
 LVPW Diastolic Thickness          0.9 cm                 
 LV Relative Wall Thickness        0.4                    
 RV Internal Dim ED PLAX           3.8 cm                1.9 - 3.8 cm 
 LVOT Diameter                     2.1 cm                 
 Aortic Root Diameter              2.6 cm                 
 LA Systolic Diameter LX           4.3 cm                3.0 - 4.0 / 2.7 - 3.8 
cm 
 Ascending Aorta Diameter          2.7 cm                 
 
 DOPPLER 
 AV Peak Velocity                  175.0 cm/s             
 AV Peak Gradient                  12.3 mmHg              
 AV Mean Velocity                  110.0 cm/s             
 AV Mean Gradient                  6.0 mmHg               
 AV Velocity Time Integral         40.9 cm                
 LVOT Peak Velocity                141.0 cm/s             
 LVOT Peak Gradient                8.0 mmHg               
 LVOT Mean Velocity                84.2 cm/s              
 LVOT Mean Gradient                4.0 mmHg               
 LVOT Velocity Time Integral       34.3 cm                
 LVOT Stroke Volume                118.8 cm              
 AV Area Cont Eq vti               2.9 cm                
 AV Area Cont Eq pk                2.8 cm                
 MV Peak Velocity                  112.0 cm/s             
 MV Peak Gradient                  5.0 mmHg               
 MV Mean Velocity                  60.3 cm/s              
 MV Mean Gradient                  2.0 mmHg               
 Mitral E Point Velocity           104.0 cm/s             
 Mitral A Point Velocity           63.2 cm/s              
 Mitral E to A Ratio               1.6                    
 MV PHT Velocity                   117.0 cm/s             
 MV Deceleration [place]             431.0 cm/s            
 MV Pressure Half Time             81.4 ms                
 MV Area PHT                       2.7 cm                
 MV Deceleration Time              218.0 ms               
 TR Peak Velocity                  204.0 cm/s             
 TR Peak Gradient                  16.6 mmHg              
 Right Atrial Pressure             5.0 mmHg               
 Pulmonary Artery Systolic Pressu  21.6 mmHg              
 Right Ventricular Systolic Press  21.6 mmHg              
 PV Peak Velocity                  119.0 cm/s             
 PV Peak Gradient                  5.7 mmHg               
 PV Mean Velocity                  78.3 cm/s              
 PV Mean Gradient                  3.0 mmHg               
 PV Velocity Time Integral         30.0 cm                
 LV E' Lateral Velocity            13.3 cm/s              
 Mitral E to LV E' Lateral Ratio   7.8                    
 LV E' Septal Velocity             10.1 cm/s              
 Mitral E to LV E' Septal Ratio    10.3

## 2017-05-04 NOTE — PN- HOUSESTAFF
SUNSHINE TRINH 17 0809:
Subjective
Follow-up For:
- Elevated troponin
- Abdominal pain
 
Complaints: no complaints
Tele-Events Since Last Visit:
NSR, HR 48-80s, Lowest HR 46. As per the pt, the HR runs on the lower range 50s 
which was recorded at home. 
Subjective:
 
Pt comfortable. Still complains of abdominal discomfort. She had several 
episodes of hypoglycemia, for which she had to be given iv dextrose. 
Unfortunately, she could not be given high glucose content containing foods. 
Vitals stable. Remained afebrile. 
 
Review of Systems
Constitutional:
Reports: see HPI. 
 
Objective
Last 24 Hrs of Vital Signs/I&O
 Vital Signs
 
 
Date Time Temp Pulse Resp B/P B/P Pulse O2 O2 Flow FiO2
 
     Mean Ox Delivery Rate 
 
 0803 98.6 60 18 130/78  98 Room Air  
 
 0000      97 Room Air  
 
 2350 98.1 56 20 118/60  97 Room Air  
 
 1639 98.6 51 20 142/70  98 Room Air  
 
 0813 98.0 58 20 140/80  97 Room Air  
 
 
 Intake & Output
 
 
  1600  0800  0000
 
Intake Total  640 340
 
Output Total   
 
Balance  640 340
 
    
 
Intake, Oral  640 340
 
Patient  236 lb 
 
Weight   
 
Weight  Standing Scale 
 
Measurement   
 
Method   
 
 
 
 
Physical Exam
General Appearance: No Acute Distress
Other Physical Findings:
General Appearance- Alert, Oriented X3, Cooperative, No Acute Distress, Mild 
Distress
Skin No Rashes, No Breakdow
HEENT Atraumatic, PERRLA, EOMI
Neck Supple, No JVD, No thryomegaly
Lymphatic Axillary nl, Cervical nl
Cardiovascular Regular Rate, Normal S1, Normal S2, No Murmurs
Lungs Normal Air Movement
Abdomen Normal Bowel Sounds, Soft, No Hepatospenomegaly (epigastric tenderness)
Neurological Normal Speech, Strength at 5/5 X4 Ext, Normal Tone, Sensation 
Intact, Cranial Nerves 3-12 NL, Reflexes 2+
Extremities No Cyanosis, No Edema, Normal Pulses, No Tenderness/Swelling
Vascular Pulses Symmetrical
Current Medications:
 Current Medications
 
 
  Sig/[place] Start time  Last
 
Medication Dose Route Stop Time Status Admin
 
Acetaminophen 650 MG Q6P PRN  1745 AC 
 
  PO   
 
Ampicillin Sodium/ 3,000 MG Q6  1833 AC 
 
Sulbactam Sodium  IV   0615
 
Sodium Chloride 100 ML    
 
Aspirin 81 MG DAILY 05/04 1000 CAN 
 
  PO   
 
Aspirin 81 MG DAILY  1000 CAN 
 
  PO   
 
Clopidogrel Bisulfate 75 MG DAILY  1215 AC 
 
  PO   1326
 
Clopidogrel Bisulfate 75 MG DAILY  1000 CAN 
 
  PO   
 
Heparin Sodium  25,000 UNIT Q24H  0600 DC 
 
(Porcine)  IV   0619
 
Sodium Chloride 500 ML    
 
Hydromorphone HCl 0.6 MG Q4P PRN  1900 AC 
 
  IV   0615
 
Multivitamins  1 TAB DAILY  1000 AC 
 
Therapeutic  PO   1009
 
Omeprazole 40 MG DAILY AC  0700 CAN 
 
  PO   
 
Omeprazole 40 MG DAILY AC  0700 AC 
 
  PO   0615
 
Ondansetron HCl 4 MG Q6P PRN  1745 AC 
 
  IV   
 
Oxycodone/ 1 TAB Q6P PRN  1900 AC 
 
Acetaminophen  PO   
 
Potassium Chloride 20 MEQ ONCE ONE  1200 DC 
 
  PO  1201  1326
 
Potassium Chloride 40 MEQ ONCE ONE  0945 DC 
 
  PO  0946  1009
 
 
 
 
Last 24 Hrs of Lab/Devaughn Results
Last 24 Hrs of Labs/Mics:
 Laboratory Tests
 
17 0645:
Anion Gap 10, Estimated GFR > 60, BUN/Creatinine Ratio 15.0
 
17 1150:
APTT 53  H
 
17 1013:
Urinalysis LIGHT  H, Urine Color YEL, Urine Clarity HAZY  H, Urine pH 6.5, Ur 
Specific Gravity 1.025, Urine Protein TRACE  H, Urine Ketones 15  H, Urine 
Nitrite NEG, Urine Bilirubin NEG@ICTO, Urine Urobilinogen 0.2, Ur Leukocyte 
Esterase NEG, Ur Microscopic SEDIMENT EXAMINED, Urine RBC 15-25  H, Urine WBC 10
-15  H, Ur Epithelial Cells MANY  H, Urine Bacteria MANY  H, Urine Hemoglobin 
LARGE  H, Urine Glucose NEG
 Microbiology
 1122  STOOL: Stool Culture - COLB
 1013  URINE ROUT: Urine Culture - RECD
 
 
 
Assessment/Plan
Assessment:
 
Ms Taveras is a 47-year-old woman  who has a past medical history of hypertension, 
obesity (gastric bypass surgery done recently).  She is being evaluated of near 
loss of consciousness 1 day.
 
Below is the problem list and plan:
 
#1 nausea, vomiting, diarrhea-likely, common etiology is viral gastroenteritis. 
Since the patient underwent gastric bypass, acute changes in microbiota could 
possibly cause diarrhea. Also has diverticultitis, for which she is started on 
Unasyn. Bowel rest for now. Metabolic abnormalities resulting from vomiting and 
diarrhea are being corrected. Restart low residue diet w/ protein supplement. 
 
#2 hypertension- Currently off medications.
 
#3 presyncope-monitor the patient on telemetry.  Monitor for arrhythmias, heart 
block. Echocardiogram pending. Elevated troponins w/ non specific T wave 
changes. Initially started on iv heparin, which was discontinued after speaking 
to Dr. Jean. Pt was already given aspirin, but plan to start on plavix in 
view of gastric bypass and increase incidence of ulcers. Discussed w/ Dr Frausto who is agreeable to have the pt on plavix. 
 
#4 DVT prophylaxis-pharmacological.
Problem List:
 1. Elevated troponin
 
 2. Diarrhea
 
Pain Ratin
Pain Location:
abdomen
Pain Goal: Pain 4 or less
Pain Plan:
tylenol prn
Tomorrow's Labs & Rationales:
cbc
bep
 
 
DAISY LOPEZ,NOHEMI 17 1343:
Attending MD Review Statement
 
Attending Statement
Attending MD Statement: examined this patient, discuss w/resident/PA/NP, agreed 
w/resident/PA/NP, reviewed EMR data (avail), discussed with nursing, discussed 
with case mgmt, reviewed images, amended to note
Attending Assessment/Plan:
Patient seen and examined, still feels somewhat nauseous.  Denies any further 
vomiting or diarrhea.  She still does not have much appetite.
 
Vital Signs
 
 
Date Time Temp Pulse Resp B/P B/P Pulse O2 O2 Flow FiO2
 
     Mean Ox Delivery Rate 
 
 0803 98.6 60 18 130/78  98 Room Air  
 
 0000      97 Room Air  
 
 2350 98.1 56 20 118/60  97 Room Air  
 
 1639 98.6 51 20 142/70  98 Room Air  
 
 
on exam; 
aox3, nad. 
cv; s1,s2, rrr, remains bradycardic. 
resp; clear
abd; soft, nt, bs+
ext; no edema. 
 
 Laboratory Tests
 
 
 
 
 0645
 
Chemistry 
 
  Sodium (137 - 145 mmol/L) 137
 
  Potassium (3.5 - 5.1 mmol/L) 4.1
 
  Chloride (98 - 107 mmol/L) 106
 
  Carbon Dioxide (22 - 30 mmol/L) 21  L
 
  Anion Gap (5 - 16) 10
 
  BUN (7 - 17 mg/dL) 9
 
  Creatinine (0.5 - 1.0 mg/dL) 0.6
 
  Estimated GFR (>60 ml/min) > 60
 
  BUN/Creatinine Ratio (7 - 25 %) 15.0
 
 
 
A/P: 47 F with pmh sig for morbid obesity s/p laparoscopic gastric bypass on  with hiatal hernia repair, who lost about 50 pounds post surgery, 
history of hypertension, GERD, admitted with abdominal pain, nausea, vomiting, 
diarrhea secondary to acute diverticulitis.  Patient also had positive troponins
likely secondary to demand ischemia.
 
At this point spoke with surgical PA who got in touch base with Dr. Crespo.  
They are recommending stage I or stage II bariatric diet with IsoPure protein 
shake 3 times a day.  This will be started.
We will try to advance her diet but if she remains nauseous then surgeries 
recommending doing an upper GI series or may be involving GI.
Continue antibiotics, will likely switch to oral in the next 24 hours.
Echocardiogram results are pending.
DVT prophylaxis: ALPS.

## 2017-05-04 NOTE — PN- GASTROENTEROLOGY
Assessment/Plan
Assessment/Recommendations:
47-year-old female, hypertensive, non-diabetic, non-hyperlipidemic, mild CUATE, 
DJD, low Vit D, post 
10/26/2016: lap CCKY for cholelithiasis prior to gastric bypass.  02/22/2017: 
Lap gastrojejunal bypass & lap HH repair per Dr. Sung.  02/23/2017: Postop 
UGI series without leak.  The patient reportedly had a preop EGD by her previous
bariatric surgeon, Dr. Herbert, that was reportedly "normal" except for the 
hiatal hernia (I cannot locate the EGD report).  She has not had a colonoscopy. 
The patient's BMI was 46 preoperatively.  She went from 288 pounds to 238 pounds
postop and is 5'8". Aside from both of the patient's parents being alcoholics, 
there is no family history of any inherited liver disease, GI malignancy, or GI 
disease.  The patient claimed a preop Persantine thallium done in 08/2016 in 
[place] by Dr. Jean was "normal."  
 
The patient went to the [place] ER 03/28/2017, with nausea, vomiting, diarrhea, 
infraumbilical abdominal pain, and questionable syncope.  Labs then showed 
normal CBC, normal CMP (except for a mildly decreased bicarbonate), normal 
amylase, normal lipase, troponin negative, serum hCG negative.  03/28/2017: CTA 
chest/CT AP with IV/po contrast- suboptimal view of pulmonary arteries, tiny 
opacities RML/RLL (felt to be postinflammatory), left hepatic hemangioma (
previously noted on ultrasound), stable right renal stone without hydronephrosis
, otherwise stable post GJ bypass.  The patient was sent from the ER on Zofran.
 
The patient returned to the [place] ER 05/02/2017, depending of similar symptoms
of nausea, vomiting, diarrhea, infraumbilical abdominal pain, and questionable 
syncope.  Her GI symptoms were unrelated to po intake.  She denied any preceding
recent travel, recent antibiotics, NSAID use, aspirin, or raw food ingestion.  
She was advancing her bariatric diet.  She claimed she was  solids and
liquids, to avoid dumping syndrome.  The vomitus was nonbilious, as expected 
post-GJ bypass, and was mostly clear & frothy.  There was no hematemesis or 
melena.  She had early satiety as expected postoperatively.  There was no blood 
or mucus in the stool.  She denied any constipation, obstipation, or tenesmus.  
She denied any rashes or acute arthralgias, aside from her DJD.  She was taking 
bariatric supplements.  She otherwise is on no outpatient medications.  She 
denied any GERD, odynophagia, dysphagia, chest pain, pleuritic pain, or 
hemoptysis.  She had mild shortness of breath.  She denied any fevers, chills, 
jaundice, GYN symptoms, URI symptoms, dysuria, frequency, air in the urine, or 
feces in the urine.  She was recently informed of an elevated T4 in 03/2017.  
Her infraumbilical pain "6 0 out of 10," seemed to radiate to the LLQ.  She 
claimed she vomited 20 times PTA.  The diarrhea was only a few times.  There was
no history of incontinence.  She is somewhat vague about the syncope.  There was
no LOC or seizure.  She denied any palpitations or focal neurologic deficits.  
She is right-handed.  She reportedly had a normal exam at her bariatric surgeon 
1 week PTA.
 
Upon arrival to the [facility] 05/02/2017, a little after 2 PM, /85, P 58,
T 98.7, R 20, O2 sat %.  She was described as pale and dehydrated.  
Fingerstick showed glucose 117.  She was given IV normal saline, morphine, sulfa
, nitroglycerin, aspirin 325 mg, Zofran, Reglan, Lovenox, multivitamin, and 
Tylenol.
 
03/22/2017: low normal thiamine 92, normal iron studies with ferritin 73.4, B12 
905, folate 19, low vitamin D 28.9.
 
Imaging studies suggestive of diverticulitis in the lower left colon (see below)
, & the patient was started on IV Unasyn 3g Q6h.  She was admitted to telemetry.
 Her admission labs were relatively stable, however she bumped up her troponin 
and was given Plavix and IV heparin, in addition to the aspirin.  The patient 
currently looks comfortable, but is receiving narcotic analgesics every 4 hours.
 
05/01/2017: Normal TFTs with normal TSH 1.28
 
05/02/2017: BCx 2- negative x 1 day.
05/03/2017: UC- negative x 1 day.
 
05/02/2017: WBC 10.4 (83% gran/9 gran Ab), H/H 15.1/45.1, normal MCV 90.3, RDW 
15.6, , *elevated D-dimer 615, PT 11, INR 1.05, PTT 32, glucose 137, BUN/
Cr 15/0.6, GFR > 60, normal electrolytes except bicarbonate 17 with anion gap 17
, normal lactate 1.8, normal amylase/lipase 47/103, Mg 1.8, Ca 9.5, normal LFTs,
including albumin 4.6, globulin 3.2, *troponin 0.08, serum hCG- negative.
 
05/02/2017: *U Tox: +OP/MS 2378, +cannabis 75.
 
05/03/2017: troponin .19, .40, .22
 
05/02/2017: EKG- SB @ 56, normal axis, borderline prolonged QT interval, QS in 
III
 
05/03/2017: EKG- NSR @ 66, old IWMI, prolonged QT interval.
 
05/02/2017:  CT ABD & PELVIS W ORAL & IV CONTRAST-
1. Small focus of diverticulitis involving the distal descending colon.  No 
abscess, free air, or perforation.  Normal appendix.  Normal small bowel loops.
2. Status post gastric bypass.
3. Status post cholecystectomy.  CBD 1 cm postop.  No choledocholithiasis.
4.  Stable 2 cm hypodensity left lobe liver, previously felt to be a hemangioma 
(noted on 
04/26/2016: sono)
5. Nonobstructive 3 mm stone upper pole of the right kidney.
6.  2.4 cm right adnexal follicular cyst.  No ascites.  Retroverted uterus.
7.  DJD.
 
05/02/2017: XRY-CHEST XRAY, PA AND LATERAL-
No acute abnormality.
 
*The patient has mild lower left colon diverticulitis by admission CT.  Her GJ 
bypass is intact.  Her nausea, vomiting and mild diarrhea could be postop in 
nature and adaptation to her bariatric diet.  Rule out dumping syndrome.  Rule 
out gastroenteritis.  Doubt bacterial enteritis.  Doubt IBD.  Doubt marginal 
ulcer postop, but possible.  Her elevated troponins are noted.  It is possible 
the nausea could be an anginal equivalent, especially with the rising troponins 
and elevated d-dimer.  Please note, 03/20/2017: CTA chest- negative for PE, but 
somewhat limited views of pulmonary arteries She recently had an elevated T4, 
but subsequent TFTs were normal.  Her LLQ symptoms certainly could be from her 
mild diverticulitis. 
 
05/03/2017: *CT ANGIOGRAM OF THE CHEST WITH CONTRAST (CT PULMONARY ANGIOGRAM FOR
PE)-
No evidence of pulmonary embolism.
 
*As of 05/04/2017, the patient remains hemodynamically stable and afebrile. CM: 
NSR @ 60. Her vomiting and diarrhea seem to have resolved.  There is minimal 
nausea.  She still has some mild LLQ symptoms, probably related to mild left-
sided diverticulitis.  She remains on IV Unasyn 3 g Q6h. 
BC/UC- negative. She has been seen by cardiology, and the minimally elevated 
troponin (peak 0.4), which is resolving, does not seem to suggest cardiac 
disease, especially in view of her relatively recent negative cardiac workup, 
done prior to her bariatric surgery.  Cardiology advised a repeat 
echocardiogram.  IV heparin was discontinued. She remains on Plavix. 05/03/17: 
CTA chest- negative for PE.  Her major issue is intermittent hypoglycemia, for 
which she required IV dextrose. *It is possible she could have late dumping 
syndrome postop.  She was started on a stage II bariatric diet with IsoPure 
protein shake 3 times a day.  She is ambulating, without any chest pain, 
shortness of breath, palpitation, or recurrent syncope. *Please note, 05/02/2017
: + U Tox: +OP/MS 2378, +cannabis 75.
 
*SUGGEST:
*Stage II bariatric, low residue diet with IsoPure protein shake 3 times a day. 
*May switch IV Unasyn 3g Q6h to Cipro 500 mg po BID & Flagyl 500 mg po TID for a
total of 10 days of antibiotics. Reglan as needed.  Zofran as needed. Empiric 
PPI. Syncope workup as per medical team. Follow-up with cardiology (await repeat
echocardiogram).  Stools are OB-negative.  Will defer to cardiology regarding 
continuing Plavix (off IV heparin).  DVT prophylaxis.  Consider bariatric 
surgery input.  Would check trace elements & repeat thiamine level, in view of 
recent GJ bypass and syncope.  Replete potassium.  IV fluids p.r.n.  MVI/
bariatric supplements. Continue to separate solids and liquids, to avoid dumping
syndrome postop (the patient's hypoglycemia could be from late dumping syndrome,
and consider medical vs. endocrine input).  As the patient's diarrhea has 
resolved, there is no stool for culture, Shiga toxin, &/or C. difficile. *Switch
to oral analgesics.  If upper GI symptoms recur (currently improving), 
consideration for UGI series > EGD (r/o marginal ulcer).  Further 
recommendations will follow, depending on clinical course.  Assuming the patient
remains stable, I will sign off on 05/05/2017, with plans for outpatient 
baseline colonoscopy in 6-8 weeks (i.e.- 07/2017), to clear her colonic mucosa 
of any colitis or lesions.  The patient has my office number.  The above was 
discussed with the medical house staff 05/04/2017.
Problem List:
 1. Diverticulitis
 
 2. Nausea and vomiting in adult
 
 3. Abdominal pain
 
 4. Diarrhea
 
 5. Gastric bypass status for obesity
 
 6. Liver hemangioma
 
 7. Morbid obesity
 
 8. Elevated troponin
 
 
Subjective
Subjective:
05/03/2017: *CT ANGIOGRAM OF THE CHEST WITH CONTRAST (CT PULMONARY ANGIOGRAM FOR
PE)-
No evidence of pulmonary embolism.
 
*As of 05/04/2017, the patient remains hemodynamically stable and afebrile. CM: 
NSR @ 60. Her vomiting and diarrhea seem to have resolved.  There is minimal 
nausea.  She still has some mild LLQ symptoms, probably related to mild left-
sided diverticulitis.  She remains on IV Unasyn 3 g Q6h. 
BC/UC- negative. She has been seen by cardiology, and the minimally elevated 
troponin (peak 0.4), which is resolving, does not seem to suggest cardiac 
disease, especially in view of her relatively recent negative cardiac workup, 
done prior to her bariatric surgery.  Cardiology advised a repeat 
echocardiogram.  IV heparin was discontinued. She remains on Plavix. 05/03/17: 
CTA chest- negative for PE.  Her major issue is intermittent hypoglycemia, for 
which she required IV dextrose. *It is possible she could have late dumping 
syndrome postop.  She was started on a stage II bariatric diet with IsoPure 
protein shake 3 times a day.  She is ambulating, without any chest pain, 
shortness of breath, palpitation, or recurrent syncope. *Please note, 05/02/2017
: + U Tox: +OP/MS 2378, +cannabis 75.
Review of Systems:
Full 14 point review of systems otherwise noncontributory, and as above.
 
Review of Systems
Constitutional:
Reports: malaise.  
Denies: chills, diaphoresis, fever, weakness, unexplained weight loss. 
EENTM:
Denies: blurred vision, double vision, visual changes, eye pain, eye drainage, 
eye tearing, icterus, ear discharge, ear pain, ear redness, hearing changes, 
nasal congestion, epistaxis, nasal pain, throat pain, throat swelling, mouth 
pain, tooth pain. 
Cardiovascular:
Reports: syncope PTA- *no recurrence.  
Denies: chest pain, edema, orthopena, palpitations, peripheral edema. 
Respiratory:
Reports: short of breath (mild)- *resolved.  
Denies: cough, hemoptysis, orthopnea, sputum production, stridor, wheezing. 
GI:
Reports: abdominal pain (infraumbilical to LLQ)- improving. Mild nausea. 
Vomiting & diarrhea- resolved. 
Denies: bloating, constipation, distention, bowel incontinence, melena, bloody 
stool, changes in stool, steatorrhea. 
Genitourinary:
Denies: discharge, dysuria, frequency, hematuria, hesitation, nocturia, pain, 
urgency. 
Musculoskeletal:
Reports: joint pain (DJD).  
Denies: back pain, gout, joint swelling, muscle pain, muscle stiffness, neck 
pain. 
Skin:
Denies: cysts, change in skin color, change in hair/nails, dryness, erythema, 
jaundice, lesions, lymphangitis, lumps, moles, rash. 
Neurological/Psychological:
Denies: anxiety, ataxia, cognitive dysfunction, confusion, depressed, dementia, 
emotional problems, headache, numbness, paresthesia, pre-existing deficit, petit
mal seizures, tingling, tremors, tonic-clonic seizures, unable to move lower ext
, unable to move upper ext, weakness. 
Hematologic/Endocrine:
Denies: bruising, bleeding, polyuria, polydipsia. 
Immunologic/Allergic:
Denies: splenectomy, HIV/AIDS, lymphadenopathy. 
All Other Systems: Reviewed and Negative
 
Objective
Vital Signs and I&Os
Vital Signs
 
 
Date Time Temp Pulse Resp B/P B/P Pulse O2 O2 Flow FiO2
 
     Mean Ox Delivery Rate 
 
05/04 1543 98.2 55 18 152/82  98   
 
05/04 0803 98.6 60 18 130/78  98 Room Air  
 
05/04 0000      97 Room Air  
 
05/03 2350 98.1 56 20 118/60  97 Room Air  
 
 
 Intake & Output
 
 
 05/04 1600 05/04 0400 05/03 1600 05/03 0400 05/02 1600 05/02 0400
 
Intake Total 3331 896 5963 550 1000 
 
Output Total   1000   
 
Balance 8157 389 3872 550 1000 
 
       
 
Intake,   4368 067 7604 
 
Intake, Oral 1842 337 5447 300  
 
Number   0 0  
 
Bowel      
 
Movements      
 
Output, Urine   1000   
 
Patient 236 lb  238 lb 240 lb 240 lb 
 
Weight      
 
Weight Standing Scale  Chair scale Reported by Patient  
 
Measurement      
 
Method      
 
 
 
Physical Exam:
Well-developed, well-nourished, obese right handed female, in NAD. Sclera 
anicteric. Conjunctiva pink.  No exophthalmos.  No lid lag.  Oropharynx clear. 
No oral thrush.  No aphthous ulcers.  There is no adenopathy, thyromegaly, or 
JVD.  No peripheral stigmata of inflammatory bowel disease or chronic liver 
disease on exam.  No spiders on the anterior chest wall. breast & pelvic exams: 
API. No CVA tenderness.  Lungs: clear to A&P, with slight decreased BS at the 
bases B/L.  Heart exam: regular rate rhythm, S1 and S2, without any murmur, rubs
, or gallops.  Abdominal exam: normal bowel sounds, soft obese belly, minimal 
LLQ tenderness, without guarding or rebound.  No mass.  No organomegaly.  No 
fluid shift.  No pulsatile mass.  No epigastric bruit.  Old scars.  No palpable 
hernia.  Digital rectal exam: deferred by myself, as OB-negative API on 05/02/
2017. Extremities: without C, C, or E. +DJD.  No palpable cords.  No palmar 
erythema.  No Dupuytren's contractures.  Distal pulses 2+ bilaterally.  DTRs 2+ 
bilaterally.  Alert and oriented x 3.  No cogwheeling or tremor, post Reglan.  
No asterixis. CN II-XII intact. Motor 5/5 B/L. 
Current Medications:
 Current Medications
 
 
  Sig/Bridgett Start time  Last
 
Medication Dose Route Stop Time Status Admin
 
Acetaminophen 650 MG Q6P PRN 05/02 1745 AC 
 
  PO   
 
Ampicillin Sodium/ 3,000 MG Q6 05/02 1833 AC 05/04
 
Sulbactam Sodium  IV   1804
 
Sodium Chloride 100 ML    
 
Clopidogrel Bisulfate 75 MG DAILY 05/03 1215 AC 05/04
 
  PO   0915
 
Dextrose 25 GM ONCE ONE 05/04 1600 DC 05/04
 
  IV 05/04 1601  1603
 
Dextrose 25 GM ONCE ONE 05/04 1145 DC 05/04
 
  IV 05/04 1146  1120
 
Dextrose 25 GM ONCE ONE 05/04 1015 DC 05/04
 
  IV 05/04 1016  0800
 
Hydromorphone HCl 0.6 MG Q4P PRN 05/02 1900 AC 05/04
 
  IV   1714
 
Multivitamins  1 TAB DAILY 05/03 1000 AC 05/04
 
Therapeutic  PO   0915
 
Omeprazole 40 MG DAILY AC 05/04 0700 AC 05/04
 
  PO   0615
 
Ondansetron HCl 4 MG Q6P PRN 05/02 1745 AC 
 
  IV   
 
Oxycodone/ 1 TAB Q6P PRN 05/02 1900 AC 
 
Acetaminophen  PO   
 
Patient Medication  1 ED .STK-MED ONE 05/04 1357 [facility] 
 
Teaching  ED 05/04 1358  
 
 
 
 
Results
Pertinent Lab Results:
 Laboratory Tests
 
 
 05/04 05/03 05/03 05/03
 
 0645 1150 1013 0700
 
Chemistry    
 
  Sodium (137 - 145 mmol/L) 137   137
 
  Potassium (3.5 - 5.1 mmol/L) 4.1   3.4  L
 
  Chloride (98 - 107 mmol/L) 106   106
 
  Carbon Dioxide (22 - 30 mmol/L) 21  L   20  L
 
  Anion Gap (5 - 16) 10   11
 
  BUN (7 - 17 mg/dL) 9   10
 
  Creatinine (0.5 - 1.0 mg/dL) 0.6   0.5
 
  Estimated GFR (>60 ml/min) > 60   > 60
 
  BUN/Creatinine Ratio (7 - 25 %) 15.0   20.0
 
  Troponin I (< 0.11 ng/ml)    0.22 *H
 
Coagulation    
 
  APTT (25 - 37 SEC)  53  H  
 
Urines    
 
  Urinalysis   LIGHT  H 
 
  Urine Color (YEL,AMB,STR)   YEL 
 
  Urine Clarity (CLEAR)   HAZY  H 
 
  Urine pH (5.0 - 8.0)   6.5 
 
  Ur Specific Gravity (1.001 - 1.035)   1.025 
 
  Urine Protein (NEG,<30 MG/DL)   TRACE  H 
 
  Urine Ketones (NEG)   15  H 
 
  Urine Nitrite (NEG)   NEG 
 
  Urine Bilirubin (NEG)   NEG@ICTO 
 
  Urine Urobilinogen (0.1  -  1.0 EU/dl)   0.2 
 
  Ur Leukocyte Esterase (NEG)   NEG 
 
  Ur Microscopic   SEDIMENT EXAMINED 
 
  Urine RBC (0 - 5 /HPF)   15-25  H 
 
  Urine WBC (0 - 2 /HPF)   10-15  H 
 
  Ur Epithelial Cells (NONE,FEW)   MANY  H 
 
  Urine Bacteria (NEG/NONE)   MANY  H 
 
  Urine Hemoglobin (NEG)   LARGE  H 
 
  Urine Glucose (N MG/DL)   NEG 
 
 
 
 
 05/03 05/02 05/02
 
 0010 1830 1422
 
Chemistry   
 
  Sodium (137 - 145 mmol/L)   140
 
  Potassium (3.5 - 5.1 mmol/L)   3.8
 
  Chloride (98 - 107 mmol/L)   105
 
  Carbon Dioxide (22 - 30 mmol/L)   17  L
 
  Anion Gap (5 - 16)   17  H
 
  BUN (7 - 17 mg/dL)   15
 
  Creatinine (0.5 - 1.0 mg/dL)   0.6
 
  Estimated GFR (>60 ml/min)   > 60
 
  BUN/Creatinine Ratio (7 - 25 %)   25.0
 
  Glucose (65 - 99 mg/dL)   137  H
 
  Lactic Acid (0.7 - 2.1 mmol/L)   1.8
 
  Calcium (8.4 - 10.2 mg/dL)   9.5
 
  Magnesium (1.6 - 2.3 mg/dL)   1.8
 
  Total Bilirubin (0.2 - 1.3 mg/dL)   0.7
 
  Direct Bilirubin (< 0.4 mg/dL)   0.4
 
  AST (14 - 36 U/L)   18
 
  ALT (9 - 52 U/L)   35
 
  Alkaline Phosphatase (<127 U/L)   89
 
  Troponin I (< 0.11 ng/ml) 0.40 *H 0.19 *H 0.08
 
  Total Protein (6.3 - 8.2 g/dL)   7.8
 
  Albumin (3.5 - 5.0 g/dL)   4.6
 
  Globulin (1.9 - 4.2 gm/dL)   3.2
 
  Albumin/Globulin Ratio (1.1 - 2.2 %)   1.4
 
  Amylase (30 - 110 U/L)   47
 
  Lipase (23 - 300 U/L)   103
 
  Total Beta HCG (NEGATIVE)   NEGATIVE
 
Hematology   
 
  CBC w Diff   NO MAN DIFF REQ
 
  WBC (4.8 - 10.8 /CUMM)   10.4
 
  RBC (4.20 - 5.40 /CUMM)   5.00
 
  Hgb (12.0 - 16.0 G/DL)   15.1
 
  Hct (37 - 47 %)   45.1
 
  MCV (81.0 - 99.0 FL)   90.3
 
  MCH (27.0 - 31.0 PG)   30.2
 
  RDW (11.5 - 14.5 %)   15.6  H
 
  Plt Count (130 - 400 /CUMM)   234
 
  MPV (7.4 - 10.4 FL)   10.6  H
 
  Gran % (42.2 - 75.2 %)   82.6  H
 
  Lymphocytes % (20.5 - 51.1 %)   13.6  L
 
  Monocytes % (1.7 - 9.3 %)   3.2
 
  Eosinophils % (0 - 5 %)   0.2
 
  Basophils % (0.0 - 2.0 %)   0.4
 
  Absolute Granulocytes (1.4 - 6.5 /CUMM)   8.6  H
 
  Absolute Lymphocytes (1.2 - 3.4 /CUMM)   1.4
 
  Absolute Monocytes (0.10 - 0.60 /CUMM)   0.3
 
  Absolute Eosinophils (0.0 - 0.7 /CUMM)   0
 
  Absolute Basophils (0.0 - 0.2 /CUMM)   0
 
  PUBS MCHC (33.0 - 37.0 G/DL)   33.5
 
 
 
 
 05/02 05/02
 
 1421 1014
 
Coagulation  
 
  PT (9.4 - 12.5 SEC) 11.0 
 
  INR (0.90 - 1.19) 1.05 
 
  APTT (25 - 37 SEC) 32 
 
  D-Dimer (70 - 232 ng/ml) 615  H 
 
Toxicology  
 
  Urine Opiates Screen (>2000 NG/ML)  2378.00  H
 
  Methadone Screen (>300 NG/ML)  < 40
 
  Barbiturate Screen (>200 NG/ML)  < 60
 
  Ur Phencyclidine Scrn (>25 NG/ML)  < 6.00
 
  Amphetamines Screen (>1000 NG/ML)  < 100
 
  U Benzodiazepines Scrn (>200 NG/ML)  < 85
 
  Urine Cocaine Screen (>300 NG/ML)  < 50
 
  Urine Cannabis Screen (>50 NG/ML)  75.00  H
 
 
 
Imaging/Other Studies:
05/02/2017: EKG- SB @ 56, normal axis, borderline prolonged QT interval, QS in 
III
 
05/03/2017: EKG- NSR @ 66, old IWMI, prolonged QT interval.
 
05/02/2017:  CT ABD & PELVIS W ORAL & IV CONTRAST-
1. Small focus of diverticulitis involving the distal descending colon.  No 
abscess, free air, or perforation.  Normal appendix.  Normal small bowel loops.
2. Status post gastric bypass.
3. Status post cholecystectomy.  CBD 1 cm postop.  No choledocholithiasis.
4.  Stable 2 cm hypodensity left lobe liver, previously felt to be a hemangioma 
(noted on 
04/26/2016: sono)
5. Nonobstructive 3 mm stone upper pole of the right kidney.
6.  2.4 cm right adnexal follicular cyst.  No ascites.  Retroverted uterus.
7.  DJD.
 
05/02/2017: XRY-CHEST XRAY, PA AND LATERAL-
No acute abnormality.
 
05/03/2017: *CT ANGIOGRAM OF THE CHEST WITH CONTRAST (CT PULMONARY ANGIOGRAM FOR
PE)-
No evidence of pulmonary embolism.

## 2017-05-04 NOTE — PN- CARDIOLOGY
Subjective
Subjective:
Interval history:
This morning she reports intermittent episodes of nonspecific abdominal 
discomfort and concerns for low blood sugar readings.
She reports that since her gastric surgery she has advanced her diet to stage IV
with no complications. 
She denies any nausea, fevers, chills, chest pain, palpitations or shortness of 
 
Review of Systems
Constitutional:
Reports: see HPI. 
EENTM:
Reports: no symptoms. 
Cardiovascular:
Reports: no symptoms. 
Respiratory:
Reports: no symptoms. 
Gastrointestinal:
Reports: see HPI. 
 
Objective
Vital Signs and I&Os
Vital Signs
 
 
Date Time Temp Pulse Resp B/P B/P Pulse O2 O2 Flow FiO2
 
     Mean Ox Delivery Rate 
 
05/05 0820 98.4 60 18 140/72  97 Room Air  
 
05/04 2216 99.4 65 18 132/70  97 Room Air  
 
05/04 1543 98.2 55 18 152/82  98   
 
 
 Intake & Output
 
 
 05/05 1600 05/05 0800 05/05 0000 05/04 1600 05/04 0800 05/04 0000
 
Intake Total  540 520 740 640 340
 
Output Total      
 
Balance  540 520 740 640 340
 
       
 
Intake, IV  240  100  
 
Intake, Oral  300 520 640 640 340
 
Patient     236 lb 
 
Weight      
 
Weight     Standing Scale 
 
Measurement      
 
Method      
 
 
 
 
Physical Exam
General Appearance: well developed/nourished, no apparent distress, alert, 
comfortable
Head: normal appearance
Ears, Nose, Throat: hearing grossly normal
Respiratory: lungs clear, Distant breath sounds BL
Cardiovascular: regular rate/rhythm
Abdomen: Diminshed bowel sounds with mild disomfort elicited on palpation of the
epigastric region
Extremities: normal range of motion, 1+ pitting edema BL LE
Current Medications:
 Current Medications
 
 
  Sig/Bridgett Start time  Last
 
Medication Dose Route Stop Time Status Admin
 
Acetaminophen 650 MG Q6P PRN 05/02 1745 AC 
 
  PO   
 
Ampicillin Sodium/ 3,000 MG Q6 05/02 1833 DC 05/05
 
Sulbactam Sodium  IV   0653
 
Sodium Chloride 100 ML    
 
Ciprofloxacin 250 MG BID 05/05 1000 AC 05/05
 
  PO 05/09 0959  0940
 
Clopidogrel Bisulfate 75 MG DAILY 05/03 1215 AC 05/05
 
  PO   0940
 
Dextrose 25 GM ONCE ONE 05/05 0645 DC 05/05
 
  IV 05/05 0646  0654
 
Dextrose 25 GM ONCE ONE 05/05 0145 DC 05/05
 
  IV 05/05 0146  0140
 
Dextrose 12.5 GM ONCE ONE 05/04 2145 DC 05/04
 
  IV 05/04 2146  2158
 
Dextrose 25 GM ONCE ONE 05/04 1600 DC 05/04
 
  IV 05/04 1601  1603
 
Dextrose 25 GM ONCE ONE 05/04 1145 DC 05/04
 
  IV 05/04 1146  1120
 
Dextrose 25 GM ONCE ONE 05/04 1015 DC 05/04
 
  IV 05/04 1016  0800
 
Dextrose/Sodium  1,000 ML Q13H 05/05 0645 AC 05/05
 
Chloride  IV 05/05 1644  0824
 
Hydromorphone HCl 0.6 MG Q4P PRN 05/02 1900 AC 05/05
 
  IV   0824
 
Metronidazole 500 MG Q8 05/05 0841 AC 05/05
 
  PO   0940
 
Multivitamins  1 TAB DAILY 05/03 1000 AC 05/05
 
Therapeutic  PO   0940
 
Omeprazole 40 MG DAILY AC 05/04 0700 AC 05/05
 
  PO   0653
 
Ondansetron HCl 4 MG Q6P PRN 05/02 1745 AC 
 
  IV   
 
Oxycodone/ 1 TAB Q6P PRN 05/02 1900 AC 
 
Acetaminophen  PO   
 
Patient Medication  1 ED .STK-MED ONE 05/04 1357 GA 
 
Teaching  ED 05/04 1358  
 
 
 
 
Results
Last 48 Hrs of Labs/Mics:
 Laboratory Tests
 
05/05/17 0610:
Anion Gap 9, Estimated GFR > 60, BUN/Creatinine Ratio 15.0, Magnesium Pending
 
05/04/17 0645:
Anion Gap 10, Estimated GFR > 60, BUN/Creatinine Ratio 15.0
 
05/03/17 1150:
APTT 53  H
 
05/03/17 1013:
Urinalysis LIGHT  H, Urine Color YEL, Urine Clarity HAZY  H, Urine pH 6.5, Ur 
Specific Gravity 1.025, Urine Protein TRACE  H, Urine Ketones 15  H, Urine 
Nitrite NEG, Urine Bilirubin NEG@ICTO, Urine Urobilinogen 0.2, Ur Leukocyte 
Esterase NEG, Ur Microscopic SEDIMENT EXAMINED, Urine RBC 15-25  H, Urine WBC 10
-15  H, Ur Epithelial Cells MANY  H, Urine Bacteria MANY  H, Urine Hemoglobin 
LARGE  H, Urine Glucose NEG
 
 
Assessment/Plan
Assessment/Plan
 
Problem list:
1.  Abdominal symptoms of nausea, vomiting, and diarrhea with evidence of 
diverticulitis on CT scan
2.  Minimally elevated troponin-peak 0.4.  At the moment, I do not see any 
evidence of any acute ischemic event.  The patient has no symptoms to suggest 
cardiac disease.  Her chest CT shows no evidence of coronary calcification.  Her
echocardiogram are ECGs are unremarkable.  Her recent evaluation including an 
echocardiogram and pharmacologic nuclear stress test were both normal.
3.  History of hypertension
4.  Status post recent gastric bypass surgery
 
Recommendations:
* Echocardiogram pending at this time
* Fluctuations in HR noted this morning can be attirubted to be secondary to 
ssympathetic response in the setting of hypoglycemic episodes. Will defer 
initiating rate control agents as this could mask the symptoms
* Check lipid panel for ASCVD classifications and initiation antihyperlipidemic 
medications if warranted
Continue telemetry? Yes

## 2017-05-05 VITALS — DIASTOLIC BLOOD PRESSURE: 72 MMHG | SYSTOLIC BLOOD PRESSURE: 140 MMHG

## 2017-05-05 VITALS — DIASTOLIC BLOOD PRESSURE: 80 MMHG | SYSTOLIC BLOOD PRESSURE: 152 MMHG

## 2017-05-05 NOTE — CONS- ENDOCRINOLOGY
General Information and HPI
 
Consulting Request
Date of Consult: 17
Requested By: medical team
Reason for Consult:
hypoglycemia s/p gastric bypass done in 2017
Source of Information: patient, old records
Exam Limitations: no limitations
History of Present Illness:
47 female with past medical history significant for morbid obesity s/p 
laparoscopic gastric bypass on 2017 with hiatal hernia repair, 
qapproximately 50 pounds weight loss post surgery, history of hypertension, GERD
, was admitted for abdominal pain, nausea, vomiting, diarrhea secondary to acute
diverticulitis.  Patient also had positive troponins, hypokalemia and 
hypomagnesemia. After she was started on Bariatric stage V diet, she had 
hypoglycemia with glucose level in the 50s after she ate Greek yogurt which has 
only 6 grams of sugar.
 
Currently she is on D5NS at 50 ml/hour and her FSGs were 104, 54, 129, 96 and 
89.
 
Allergies/Medications
Allergies:
Coded Allergies:
No Known Allergies (10/25/16)
 
Home Med List:
Ergocalciferol (Vitamin D2) (Vitamin D2) 50,000 UNIT CAPSULE   1 CAP PO QMON 
SUPPLEMENT  (Reported)
Multivitamin (Multi-Day Vitamins) 1 EACH TABLET   1 TAB PO DAILY SUPPLEMENT-
BARIATRIC  (Reported)
 
 
Review of Systems
 
Review of Systems
Constitutional:
Reports: see HPI. 
Cardiovascular:
Denies: chest pain. 
Respiratory:
Denies: short of breath. 
GI:
Reports: abdominal pain. 
Genitourinary:
Denies: dysuria. 
Hematologic/Endocrine:
Denies: polyuria, polydipsia. 
 
Past History
 
Travel History
Traveled to [place] past 21 day No
 
Medical History
Blood Transfusion Hx: No
Neurological: NONE
EENT: NONE
Cardiovascular: hypertension
Respiratory: ? mild CUATE
Gastrointestinal: POST OP N/V; GJ bypass
Hepatic: hemangioma left lobe liver
Renal: nephrolithiasis (on right w/o hydro; ? type)
Musculoskeletal: degen joint disease
Psychiatric: NONE
Endocrine: vitamin D deficiency
Blood Disorders: NONE
Cancer(s): NONE
GYN/Reproductive: NONE
 
Surgical History
Surgical History: cholecystectomy (10/26/16: lap CCKY), 17: LAP  GJ BYPASS
& LAP HH REPAIR
 
Family History
Relations & Conditions If Any:
FATHER (unknown cause, + EtOH). , Age 60+.
MOTHER (EtOH). Age 60+.
Relation not specified for:
  *No pertinent family history
 
 
Psychosocial History
Where Do You Live? Home
Who Do You Live With? child
Services at Home: None
Primary Language: English
Smoking Status: Never Smoked
ETOH Use: denies use (none recently), occasional use
Illicit Drug Use: denies illicit drug use
Living Will? no
Power of /HCP? no
Other Social History:
Single. Lives with 18 y/o dtr- [employer]. No
 cigarettes, drugs or EtOH. manager at
 [employer].
 
Functional Ability
ADLs
Independent: dressing, eating, toileting, bathing. 
Ambulation: independent
IADLs
Independent: shopping, housework, finances, food prep, telephone, transportation
, medication admin. 
 
Employment History
Employment: Employed
Profession/Employer: [employer]
 
Exam & Diagnostic Data
Last 24 Hrs of Vital Signs/I&O
 Vital Signs
 
 
Date Time Temp Pulse Resp B/P B/P Pulse O2 O2 Flow FiO2
 
     Mean Ox Delivery Rate 
 
 0820 98.4 60 18 140/72  97 Room Air  
 
 2216 99.4 65 18 132/70  97 Room Air  
 
 1543 98.2 55 18 152/82  98   
 
 
 Intake & Output
 
 
  1600  0800  0000
 
Intake Total  540 520
 
Output Total   
 
Balance  540 520
 
    
 
Intake, IV  240 
 
Intake, Oral  300 520
 
 
 
 
Physical Exam
General Appearance: no apparent distress
Cardiovascular: regular rate/rhythm
Gastrointestinal: soft
Extremities: no edema
Labs/Devaughn Results:
 Laboratory Tests
 
 
 
 
 0610 0645
 
Chemistry  
 
  Sodium (137 - 145 mmol/L) 138 137
 
  Potassium (3.5 - 5.1 mmol/L) 3.6 4.1
 
  Chloride (98 - 107 mmol/L) 104 106
 
  Carbon Dioxide (22 - 30 mmol/L) 25 21  L
 
  Anion Gap (5 - 16) 9 10
 
  BUN (7 - 17 mg/dL) 9 9
 
  Creatinine (0.5 - 1.0 mg/dL) 0.6 0.6
 
  Estimated GFR (>60 ml/min) > 60 > 60
 
  BUN/Creatinine Ratio (7 - 25 %) 15.0 15.0
 
  Insulin Level (3.0 - 25.0 mIU/mL) 4.1 
 
  Magnesium (1.6 - 2.3 mg/dL) 1.5  L 
 
  Cortisol AM Sample (4.46 - 22.7 ug/dL) Pending 
 
 
 
 
Assessment/Plan
Assessment/Plan
47 female with past medical history significant for morbid obesity s/p 
laparoscopic gastric bypass on 2017 with hiatal hernia repair, 
qapproximately 50 pounds weight loss post surgery, history of hypertension, GERD
, was admitted for abdominal pain, nausea, vomiting, diarrhea secondary to acute
diverticulitis.  Patient also had positive troponins, hypokalemia and 
hypomagnesemia. After she was started on Bariatric stage V diet, she had 
hypoglycemia with glucose level in the 50s after she ate Greek yogurt which has 
only 6 grams of sugar.
 
Hypoglycemia s/p gastric bypass approximately 2 months ago-- etiology unclear at
this point.
 
1. D/C IVF
2. monitor FSGs before each meal, two hours after each meal, bedtime, midnight 
and 3 am.
3. recommend 3 small meals and 3 snacks between meals;
 ---complex carbohydrates 30 grams per meal
 ---complex carbohydrates 15 grams per snack
 ---encourage eating protein
 ---avoid simple carbohydrates
4. if FSG is < 60, please draw blood sample stat to check plasma glucose, 
insulin, c-peptide and random cortisol.
 
will follow. Please let me know if there are any questions.
 
 
 
Consult Acknowledgment
- Thank you for your consult request.

## 2017-05-05 NOTE — PN- CARDIOLOGY
Subjective
Subjective:
Interval history:
This morning the patient reports an episode of sweating, nausea, dizziness and 
mild palpitations.  She was noted to have an episode of tachycardia bradycardia 
on telemetry monitoring.  Coincidentally, this also happened after she did have 
2 tablespoons of yogurt.
She denies any persistent, new onset blurred vision, headache, chest pain, 
persistent palpitations or shortness of breath at this time.
Episode of bradycardia on telemetry this morning. EKG did show what appears to 
be PACs rather than heart block.
 
Review of Systems
Constitutional:
Reports: see HPI. 
EENTM:
Reports: no symptoms. 
Cardiovascular:
Reports: see HPI. 
Respiratory:
Reports: see HPI. 
Gastrointestinal:
Reports: see HPI. 
Musculoskeletal:
Reports: see HPI. 
 
Objective
Vital Signs and I&Os
Vital Signs
 
 
Date Time Temp Pulse Resp B/P B/P Pulse O2 O2 Flow FiO2
 
     Mean Ox Delivery Rate 
 
05/05 0820 98.4 60 18 140/72  97 Room Air  
 
05/04 2216 99.4 65 18 132/70  97 Room Air  
 
05/04 1543 98.2 55 18 152/82  98   
 
 
 Intake & Output
 
 
 05/05 1600 05/05 0800 05/05 0000 05/04 1600 05/04 0800 05/04 0000
 
Intake Total  540 520 740 640 340
 
Output Total      
 
Balance  540 520 740 640 340
 
       
 
Intake, IV  240  100  
 
Intake, Oral  300 520 640 640 340
 
Patient     236 lb 
 
Weight      
 
Weight     Standing Scale 
 
Measurement      
 
Method      
 
 
 
 
Physical Exam
General Appearance: alert, anxious
Head: normal appearance
Ears, Nose, Throat: hearing grossly normal
Respiratory: no respiratory distress, decreased breath sounds
Cardiovascular: regular rate/rhythm, 1+ pitting edema bilateral lower 
extremities
Abdomen: normal bowel sounds, soft, slight discomfort elicited on palpation over
the epigastric region
Extremities: 1+ pitting edema bilateral lower extremities
Current Medications:
 Current Medications
 
 
  Sig/Bridgett Start time  Last
 
Medication Dose Route Stop Time Status Admin
 
Acetaminophen 650 MG Q6P PRN 05/02 1745 AC 
 
  PO   
 
Ampicillin Sodium/ 3,000 MG Q6 05/02 1833 DC 05/05
 
Sulbactam Sodium  IV   0653
 
Sodium Chloride 100 ML    
 
Ciprofloxacin 250 MG BID 05/05 1000 AC 05/05
 
  PO 05/09 0959  0940
 
Clopidogrel Bisulfate 75 MG DAILY 05/03 1215 AC 05/05
 
  PO   0940
 
Dextrose 25 GM ONCE ONE 05/05 0645 DC 05/05
 
  IV 05/05 0646  0654
 
Dextrose 25 GM ONCE ONE 05/05 0145 DC 05/05
 
  IV 05/05 0146  0140
 
Dextrose 12.5 GM ONCE ONE 05/04 2145 DC 05/04
 
  IV 05/04 2146  2158
 
Dextrose 25 GM ONCE ONE 05/04 1600 DC 05/04
 
  IV 05/04 1601  1603
 
Dextrose 25 GM ONCE ONE 05/04 1145 DC 05/04
 
  IV 05/04 1146  1120
 
Dextrose/Sodium  1,000 ML Q13H 05/05 0645 DC 05/05
 
Chloride  IV 05/05 1644  0824
 
Hydromorphone HCl 0.6 MG Q4P PRN 05/02 1900 AC 05/05
 
  IV   0824
 
Magnesium Sulfate 1 GM Q2H 05/05 1030 AC 05/05
 
Dextrose/Water 100 ML IV 05/05 1429  1133
 
Metronidazole 500 MG Q8 05/05 0841 AC 05/05
 
  PO   0940
 
Multivitamins  1 TAB DAILY 05/03 1000 AC 05/05
 
Therapeutic  PO   0940
 
Omeprazole 40 MG DAILY AC 05/04 0700 AC 05/05
 
  PO   0653
 
Ondansetron HCl 4 MG Q6P PRN 05/02 1745 AC 
 
  IV   
 
Oxycodone/ 1 TAB Q6P PRN 05/02 1900 AC 
 
Acetaminophen  PO   
 
Patient Medication  1 ED .STK-MED ONE 05/04 1357 DC 
 
Teaching  ED 05/04 1358  
 
Potassium Chloride 20 MEQ Q10H 05/05 1030 DC 
 
Dextrose/Sodium  1,000 ML IV 05/05 2029  
 
Chloride     
 
 
 
 
Results
Last 48 Hrs of Labs/Mics:
 Laboratory Tests
 
05/05/17 0610:
Anion Gap 9, Estimated GFR > 60, BUN/Creatinine Ratio 15.0, Magnesium 1.5  L
 
05/04/17 0645:
Anion Gap 10, Estimated GFR > 60, BUN/Creatinine Ratio 15.0
 
05/03/17 1150:
APTT 53  H
 
 
Assessment/Plan
Assessment/Plan
39-year-old lady with a PMH of HTN, obesity S/P gastric bypass surgery who was 
admitted with complaints of near syncope.
 
Problem list:
1.  Nausea, vomiting, diarrhea: Recent gastric bypass surgery
2.  Diverticulitis
3.  Elevated troponins: No evidence suggestive of cardiac disease at this time 
or coronary calcification on chest CT.  Normal recent pharmacological stress 
test
4.  PACs on telemetry 
5.  Hypertension
6.  Hypomagnesemia
7.  Mild Hypokalemia
 
Recommendations:
* Telemetry this morning showed a brief run of PVCs.  Repeat EKG unremarkable 
aside from slightly prolonged QTC
* In the setting of hypomagnesemia and her current GI symptoms, recommend IV 
supplementation with mag sulfate 2
* Potassium supplementation > 4.0
* Echocardiogram with normal LVEF and valve function
* Check lipid panel for ASCVD classifications and initiation antihyperlipidemic 
medications if warranted
Continue telemetry? Yes

## 2017-05-05 NOTE — PN- HOUSESTAFF
SUNSHINE TRINH 17 0718:
Subjective
Follow-up For:
- Abdominal discomfort
- Nausea and vomiting
 
Complaints: no complaints
Tele-Events Since Last Visit:
NSR, heart rate 45-69.
Subjective:
Patient was comfortable this morning.  Did not have any complaints.  Vitals were
stable overnight.
 
During the day yesterday it was noted that the patient had several episodes of 
hypoglycemia.  This a.m., after the patient had yogurt, she was found to be 
hypoglycemic blood glucose around 50, and heart rate went up to 140-150s 
associated with diaphoresis.
 
Review of Systems
Constitutional:
Reports: see HPI. 
 
Objective
Last 24 Hrs of Vital Signs/I&O
 Vital Signs
 
 
Date Time Temp Pulse Resp B/P B/P Pulse O2 O2 Flow FiO2
 
     Mean Ox Delivery Rate 
 
 2216 99.4 65 18 132/70  97 Room Air  
 
 1543 98.2 55 18 152/82  98   
 
 0803 98.6 60 18 130/78  98 Room Air  
 
 
 Intake & Output
 
 
  0800  0000  1600
 
Intake Total  520 740
 
Output Total   
 
Balance  520 740
 
    
 
Intake, IV   100
 
Intake, Oral  520 640
 
 
 
 
Physical Exam
General Appearance: No Acute Distress
Other Physical Findings:
General Appearance- Alert, Oriented X3, Cooperative, No Acute Distress, Mild 
Distress
Skin No Rashes, No Breakdow
HEENT Atraumatic, PERRLA, EOMI
Neck Supple, No JVD, No thryomegaly
Lymphatic Axillary nl, Cervical nl
Cardiovascular Regular Rate, Normal S1, Normal S2, No Murmurs
Lungs Normal Air Movement
Abdomen Normal Bowel Sounds, Soft, No Hepatospenomegaly (epigastric tenderness)
Neurological Normal Speech, Strength at 5/5 X4 Ext, Normal Tone, Sensation 
Intact, Cranial Nerves 3-12 NL, Reflexes 2+
Extremities No Cyanosis, No Edema, Normal Pulses, No Tenderness/Swelling
Vascular Pulses Symmetrical
Current Medications:
 Current Medications
 
 
  Sig/Bridgett Start time  Last
 
Medication Dose Route Stop Time Status Admin
 
Acetaminophen 650 MG Q6P PRN  1745 AC 
 
  PO   
 
Ampicillin Sodium/ 3,000 MG Q6  1833 AC 
 
Sulbactam Sodium  IV   0653
 
Sodium Chloride 100 ML    
 
Clopidogrel Bisulfate 75 MG DAILY  1215 AC 
 
  PO   0915
 
Dextrose 25 GM ONCE ONE  0645 UNVr 05/05
 
  IV  0646  0654
 
Dextrose 25 GM ONCE ONE  0145 DC 05
 
  IV  0146  0140
 
Dextrose 12.5 GM ONCE ONE  2145 DC 
 
  IV  2146  2158
 
Dextrose 25 GM ONCE ONE  1600 DC 05/
 
  IV  1601  1603
 
Dextrose 25 GM ONCE ONE  1145 DC 05/
 
  IV  1146  1120
 
Dextrose 25 GM ONCE ONE  1015 DC 
 
  IV  1016  0800
 
Dextrose/Sodium  1,000 ML Q13H  0645 UNVr 
 
Chloride  IV  1944  
 
Hydromorphone HCl 0.6 MG Q4P PRN  1900 AC 
 
  IV   0259
 
Multivitamins  1 TAB DAILY  1000 AC 
 
Therapeutic  PO   0915
 
Omeprazole 40 MG DAILY AC  0700 AC 
 
  PO   0653
 
Ondansetron HCl 4 MG Q6P PRN  1745 AC 
 
  IV   
 
Oxycodone/ 1 TAB Q6P PRN  1900 AC 
 
Acetaminophen  PO   
 
Patient Medication  1 ED .STK-MED ONE  1357 NE 
 
Teaching  ED  1358  
 
 
 
 
Last 24 Hrs of Lab/Devaughn Results
Last 24 Hrs of Labs/Mics:
 Laboratory Tests
 
17 0610:
Sodium Pending, Potassium Pending, Chloride Pending, Carbon Dioxide Pending, 
Anion Gap Pending, BUN Pending, Creatinine Pending, BUN/Creatinine Ratio Pending
 
 
Assessment/Plan
Assessment:
 
Ms Taveras is a 47-year-old woman  who has a past medical history of hypertension, 
obesity (gastric bypass surgery done recently).  She is being evaluated of near 
loss of consciousness 1 day.
 
Below is the problem list and plan:
 
#1 nausea, vomiting, diarrhea-likely, common etiology is viral gastroenteritis. 
Since the patient underwent gastric bypass, acute changes in microbiota could 
possibly cause diarrhea. Also has diverticultitis, for which she is started on 
Unasyn.  Changed antibiotics to by mouth ciprofloxacin and metronidazole for 
now.  Metabolic abnormalities resulting from vomiting and diarrhea are being 
corrected. Restart low glucose diet w/ protein supplement. 
 
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  Patient found to
be hypoglycemic after small sugar load.  To check glucose, cortisol, insulin, C-
peptide when the patient becomes hypoglycemic blood glucose around 60 mg/dL.  
Change the diet pattern, with 30 g of complex carbohydrate every meal 
interspersed by 15 g of carbohydrate snack.  To check blood glucose prior to 
every meal and 2 hours after each meal.  Also check blood glucose if the patient
becomes symptomatic.  Flowchart/alcoholism provided to the nurses.  Explained to
the patient in detail.  This would allow us to #1 as certain if hypoglycemic 
episodes is indeed hyperinsulinemic as opposed to decreasing counterregulatory 
hormones such as cortisol/growth hormone.  Discussed in great detail with the 
endocrinologist and Dr. Crespo.
 
#2 hypertension- Currently off medications.
 
#3 presyncope-monitor the patient on telemetry.  Monitor for arrhythmias, heart 
block.  Elevated troponins w/ non specific T wave changes. Initially started on 
iv heparin, which was discontinued after speaking to Dr. Jean. Pt was already
given aspirin, but plan to start on plavix in view of gastric bypass and 
increase incidence of ulcers. Discussed w/ Dr Frausto who is agreeable to have 
the pt on plavix. 
 
#4 DVT prophylaxis-pharmacological.
 
#5 pain management-currently on intravenous hydromorphone.  To change the 
medication to by mouth in the a.m.
Problem List:
 1. Elevated troponin
 
 2. Gastric bypass status for obesity
 
 3. Diarrhea
 
Pain Ratin
Pain Location:
abdomen
Pain Goal: Pain 4 or less
Pain Plan:
dilaudid
Tomorrow's Labs & Rationales:
cbc
epip
 
 
DAISY LOPEZ,NOHEMI 17 1235:
Attending MD Review Statement
 
Attending Statement
Attending MD Statement: examined this patient, discuss w/resident/PA/NP, agreed 
w/resident/PA/NP, reviewed EMR data (avail), discussed with nursing, discussed 
with case mgmt, amended to note
Attending Assessment/Plan:
Patient seen and examined, not feeling well.  This morning she developed 
hypoglycemia after she ate and also became tachycardic showing signs of late 
dumping syndrome.  She has had couple of episodes of bradycardia also overnight.
 She has hypokalemia and hypomagnesemia.
 
Vital Signs
 
 
Date Time Temp Pulse Resp B/P B/P Pulse O2 O2 Flow FiO2
 
     Mean Ox Delivery Rate 
 
 0820 98.4 60 18 140/72  97 Room Air  
 
 2216 99.4 65 18 132/70  97 Room Air  
 
 1543 98.2 55 18 152/82  98   
 
 
 
on exam; 
aox3, nad. 
cv; s1,s2, rrr
resp; clear
abd; soft, nt, bs+
ext; no edema. 
 Laboratory Tests
 
 
 
 
 0610
 
Chemistry 
 
  Sodium (137 - 145 mmol/L) 138
 
  Potassium (3.5 - 5.1 mmol/L) 3.6
 
  Chloride (98 - 107 mmol/L) 104
 
  Carbon Dioxide (22 - 30 mmol/L) 25
 
  Anion Gap (5 - 16) 9
 
  BUN (7 - 17 mg/dL) 9
 
  Creatinine (0.5 - 1.0 mg/dL) 0.6
 
  Estimated GFR (>60 ml/min) > 60
 
  BUN/Creatinine Ratio (7 - 25 %) 15.0
 
  Magnesium (1.6 - 2.3 mg/dL) 1.5  L
 
 
 
A/P; 47 F with pmh sig for morbid obesity s/p laparoscopic gastric bypass on  with hiatal hernia repair, who lost about 50 pounds post surgery, 
history of hypertension, GERD, admitted with abdominal pain, nausea, vomiting, 
diarrhea secondary to acute diverticulitis.  Patient also had positive troponins
likely secondary to demand ischemia.  Now showing signs of late dumping 
syndrome.  Also has hypokalemia and hypomagnesemia.  Becoming hypoglycemic 
frequently.
 
At this point I discussed with Dr. Crespo from bariatric surgery.  He 
recommends stage V bariatric diet.  He recommends low sugars as sugar will 
produce symptoms of dumping syndrome.
Will consult endocrinology.
Initially started the fluids to D5 but at this point we'll stop it after we 
spoke with endocrinology.
Please replete her potassium and magnesium.
Antibiotics have been switched to oral.
DVT prophylaxis: Please start the patient on Lovenox for DVT prophylaxis.
Not ready for discharge yet.

## 2017-05-05 NOTE — PN- GASTROENTEROLOGY
Assessment/Plan
Assessment/Recommendations:
47-year-old female, hypertensive, non-diabetic, non-hyperlipidemic, mild CUATE, 
DJD, low Vit D, post 10/26/2016: lap CCKY for cholelithiasis prior to gastric 
bypass.  02/22/2017: Lap gastrojejunal bypass & lap HH repair per Dr. Sung.  
02/23/2017: Postop UGI series without leak.  The patient reportedly had a preop 
EGD by her previous bariatric surgeon, Dr. Herbert, that was reportedly "normal
" except for the hiatal hernia (I cannot locate the EGD report).  She has not 
had a colonoscopy.  The patient's BMI was 46 preoperatively.  She went from 288 
pounds to 238 pounds postop and is 5'8". Aside from both of the patient's 
parents being alcoholics, there is no family history of any inherited liver 
disease, GI malignancy, or GI disease.  The patient claimed a preop Persantine 
thallium done in 08/2016 in [place] by Dr. Jean was "normal."  
 
The patient went to the [place] ER 03/28/2017, with nausea, vomiting, diarrhea, 
infraumbilical abdominal pain, and questionable syncope.  Labs then showed 
normal CBC, normal CMP (except for a mildly decreased bicarbonate), normal 
amylase, normal lipase, troponin negative, serum hCG negative.  03/28/2017: CTA 
chest/CT AP with IV/po contrast- suboptimal view of pulmonary arteries, tiny 
opacities RML/RLL (felt to be postinflammatory), left hepatic hemangioma (
previously noted on ultrasound), stable right renal stone without hydronephrosis
, otherwise stable post GJ bypass.  The patient was sent from the ER on Zofran.
 
The patient returned to the [place] ER 05/02/2017, depending of similar symptoms
of nausea, vomiting, diarrhea, infraumbilical abdominal pain, and questionable 
syncope.  Her GI symptoms were unrelated to po intake.  She denied any preceding
recent travel, recent antibiotics, NSAID use, aspirin, or raw food ingestion.  
She was advancing her bariatric diet.  She claimed she was  solids and
liquids, to avoid dumping syndrome.  The vomitus was nonbilious, as expected 
post-GJ bypass, and was mostly clear & frothy.  There was no hematemesis or 
melena.  She had early satiety as expected postoperatively.  There was no blood 
or mucus in the stool.  She denied any constipation, obstipation, or tenesmus.  
She denied any rashes or acute arthralgias, aside from her DJD.  She was taking 
bariatric supplements.  She otherwise is on no outpatient medications.  She 
denied any GERD, odynophagia, dysphagia, chest pain, pleuritic pain, or 
hemoptysis.  She had mild shortness of breath.  She denied any fevers, chills, 
jaundice, GYN symptoms, URI symptoms, dysuria, frequency, air in the urine, or 
feces in the urine.  She was recently informed of an elevated T4 in 03/2017.  
Her infraumbilical pain "6 0 out of 10," seemed to radiate to the LLQ.  She 
claimed she vomited 20 times PTA.  The diarrhea was only a few times.  There was
no history of incontinence.  She is somewhat vague about the syncope.  There was
no LOC or seizure.  She denied any palpitations or focal neurologic deficits.  
She is right-handed.  She reportedly had a normal exam at her bariatric surgeon 
1 week PTA.
 
Upon arrival to the [facility] 05/02/2017, a little after 2 PM, /85, P 58,
T 98.7, R 20, O2 sat %.  She was described as pale and dehydrated.  
Fingerstick showed glucose 117.  She was given IV normal saline, morphine, sulfa
, nitroglycerin, aspirin 325 mg, Zofran, Reglan, Lovenox, multivitamin, and 
Tylenol.
 
03/22/2017: low normal thiamine 92, normal iron studies with ferritin 73.4, B12 
905, folate 19, low vitamin D 28.9.
 
Imaging studies suggestive of diverticulitis in the lower left colon (see below)
, & the patient was started on IV Unasyn 3g Q6h.  She was admitted to telemetry.
 Her admission labs were relatively stable, however she bumped up her troponin 
and was given Plavix and IV heparin, in addition to the aspirin.  The patient 
currently looks comfortable, but is receiving narcotic analgesics every 4 hours.
 
05/01/2017: Normal TFTs with normal TSH 1.28
 
05/02/2017: BCx 2- negative x 1 day.
05/03/2017: UC- negative x 1 day.
 
05/02/2017: WBC 10.4 (83% gran/9 gran Ab), H/H 15.1/45.1, normal MCV 90.3, RDW 
15.6, , *elevated D-dimer 615, PT 11, INR 1.05, PTT 32, glucose 137, BUN/
Cr 15/0.6, GFR > 60, normal electrolytes except bicarbonate 17 with anion gap 17
, normal lactate 1.8, normal amylase/lipase 47/103, Mg 1.8, Ca 9.5, normal LFTs,
including albumin 4.6, globulin 3.2, *troponin 0.08, serum hCG- negative.
 
05/02/2017: *U Tox: +OP/MS 2378, +cannabis 75.
 
05/03/2017: troponin .19, .40, .22
 
05/02/2017: EKG- SB @ 56, normal axis, borderline prolonged QT interval, QS in 
III
 
05/03/2017: EKG- NSR @ 66, old IWMI, prolonged QT interval.
 
05/02/2017:  CT ABD & PELVIS W ORAL & IV CONTRAST-
1. Small focus of diverticulitis involving the distal descending colon.  No 
abscess, free air, or perforation.  Normal appendix.  Normal small bowel loops.
2. Status post gastric bypass.
3. Status post cholecystectomy.  CBD 1 cm postop.  No choledocholithiasis.
4.  Stable 2 cm hypodensity left lobe liver, previously felt to be a hemangioma 
(noted on 
04/26/2016: sono)
5. Nonobstructive 3 mm stone upper pole of the right kidney.
6.  2.4 cm right adnexal follicular cyst.  No ascites.  Retroverted uterus.
7.  DJD.
 
05/02/2017: XRY-CHEST XRAY, PA AND LATERAL-
No acute abnormality.
 
*The patient has mild lower left colon diverticulitis by admission CT.  Her GJ 
bypass is intact.  Her nausea, vomiting and mild diarrhea could be postop in 
nature and adaptation to her bariatric diet.  Rule out dumping syndrome.  Rule 
out gastroenteritis.  Doubt bacterial enteritis.  Doubt IBD.  Doubt marginal 
ulcer postop, but possible.  Her elevated troponins are noted.  It is possible 
the nausea could be an anginal equivalent, especially with the rising troponins 
and elevated d-dimer.  Please note, 03/20/2017: CTA chest- negative for PE, but 
somewhat limited views of pulmonary arteries She recently had an elevated T4, 
but subsequent TFTs were normal.  Her LLQ symptoms certainly could be from her 
mild diverticulitis. 
 
05/03/2017: *CT ANGIOGRAM OF THE CHEST WITH CONTRAST (CT PULMONARY ANGIOGRAM FOR
PE)-
No evidence of pulmonary embolism.
 
*As of 05/04/2017, the patient remains hemodynamically stable and afebrile. CM: 
NSR @ 60. Her vomiting and diarrhea seem to have resolved.  There is minimal 
nausea.  She still has some mild LLQ symptoms, probably related to mild left-
sided diverticulitis.  She remains on IV Unasyn 3 g Q6h. 
BC/UC- negative. She has been seen by cardiology, and the minimally elevated 
troponin (peak 0.4), which is resolving, does not seem to suggest cardiac 
disease, especially in view of her relatively recent negative cardiac workup, 
done prior to her bariatric surgery.  Cardiology advised a repeat 
echocardiogram.  IV heparin was discontinued. She remains on Plavix. 05/03/17: 
CTA chest- negative for PE.  Her major issue is intermittent hypoglycemia, for 
which she required IV dextrose. *It is possible she could have late dumping 
syndrome postop.  She was started on a stage II bariatric diet with IsoPure 
protein shake 3 times a day.  She is ambulating, without any chest pain, 
shortness of breath, palpitation, or recurrent syncope. **Please note, 05/02/
2017: 
+ U Tox: +OP/MS 2378, +cannabis 75.
 
05/04/2017: ECHOCARDIOGRAM-
1. The aortic valve is trileafle and normal. 
2. The mitral valve is anatomically normal with minimal to mild  
 mitral insufficiency. 
3. There is no pericardial fluid present. 
4. The left ventricular chamber size and systolic function are  
 normal, with LVEF 60-65%. 
5. Minimal to mild tricuspid and pulmonic insufficiency are present  
 with no evidence of pulmonary hypertension. 
 
*As of 05/05/2017, the patient remains clinically stable regarding her mild left
-sided diverticulitis.  She is hemodynamically stable, with Tm 99.4.  She is 
tolerating a stage II bariatric diet.  Her major issues are intermittent 
hypoglycemia to the mid 50's, most episodes of which are asymptomatic and 
incidentally found.  She is ambulating and receiving D51/2 NS @ 75 cc/hr. 05/04/
2017: ECHO- normal LVEF 60-65%, without wall motion abnormalities. There are no 
other new issues.
 
*SUGGEST:
*Stage II bariatric, low residue diet with IsoPure protein shake 3 times a day. 
*May switch IV Unasyn 3g Q6h to Cipro 500 mg po BID & Flagyl 500 mg po TID for a
total of 10 days of antibiotics. Reglan as needed.  Zofran as needed. Empiric 
PPI. Syncope workup as per medical team. Follow-up with cardiology (05/04/2017: 
*Echocardiogram- stable).  Stools are OB-negative.  Will defer to cardiology 
regarding continuing Plavix (off IV heparin).  DVT prophylaxis.  Consider 
bariatric surgery input.  Would check trace elements & repeat thiamine level, in
view of recent GJ bypass and syncope.  Replete potassium (done).  IV fluids 
p.r.n- currently D51/2 NS @ 75 cc/hr.  MVI/bariatric supplements. Continue to 
separate solids and liquids, to avoid dumping syndrome postop (the patient's 
hypoglycemia could be from late dumping syndrome, and consider medical vs. 
endocrine input- may benefit from small feeds at shorter intervals).  As the 
patient's diarrhea has resolved, there is no stool for culture, Shiga toxin, &/
or C. difficile. *Switch to oral analgesics (**please note + admission U Tox for
OP/MS/cannabis).  If upper GI symptoms recur (currently improved), consideration
for UGI series > EGD (r/o marginal ulcer). 
 
**As the patient remains stable, I will sign off today, 05/05/2017, with plans 
for outpatient baseline colonoscopy in 6-8 weeks (i.e.- 07/2017), to clear her 
colonic mucosa of any colitis or lesions.  The patient has my office number.  
Her mild left-sided diverticulitis is stable, and her major issues at present 
are the intermittent episodes of hypoglycemia, most of which are asymptomatic. 
The above was discussed with the medical house staff again on 05/05/2017.
Problem List:
 1. Diverticulitis
 
 2. Abdominal pain
 
 3. Gastric bypass status for obesity
 
 4. Nausea and vomiting in adult
 
 5. Liver hemangioma
 
 6. Morbid obesity
 
 7. Diarrhea
 
 8. Elevated troponin
 
 
Subjective
Subjective:
05/04/2017: ECHOCARDIOGRAM-
1. The aortic valve is trileafle and normal. 
2. The mitral valve is anatomically normal with minimal to mild  
 mitral insufficiency. 
3. There is no pericardial fluid present. 
4. The left ventricular chamber size and systolic function are  
 normal, with LVEF 60-65%. 
5. Minimal to mild tricuspid and pulmonic insufficiency are present  
 with no evidence of pulmonary hypertension. 
 
*As of 05/05/2017, the patient remains clinically stable regarding her mild left
-sided diverticulitis.  She is hemodynamically stable, with Tm 99.4.  She is 
tolerating a stage II bariatric diet.  Her major issues are intermittent 
hypoglycemia to the mid 50's, most episodes of which are asymptomatic and 
incidentally found.  She is ambulating and receiving D51/2 NS @ 75 cc/hr. 05/04/
2017: ECHO- normal LVEF 60-65%, without wall motion abnormalities. There are no 
other new issues.
Review of Systems:
Full 14 point review of systems otherwise noncontributory, and as above.
 
Review of Systems
Constitutional:
Reports: malaise.  
Denies: chills, diaphoresis, fever, weakness, unexplained weight loss. 
EENTM:
Denies: blurred vision, double vision, visual changes, eye pain, eye drainage, 
eye tearing, icterus, ear discharge, ear pain, ear redness, hearing changes, 
nasal congestion, epistaxis, nasal pain, throat pain, throat swelling, mouth 
pain, tooth pain. 
Cardiovascular:
Reports: syncope PTA- *no recurrence.  
Denies: chest pain, edema, orthopena, palpitations, peripheral edema. 
Respiratory:
Reports: mild short of breath PTA- *resolved.  
Denies: cough, hemoptysis, orthopnea, sputum production, stridor, wheezing. 
GI:
Reports: abdominal pain (infraumbilical to LLQ)- *improved. Mininmal nausea. 
Vomiting & diarrhea- *resolved. 
Denies: bloating, constipation, distention, bowel incontinence, melena, bloody 
stool, changes in stool, steatorrhea. 
Genitourinary:
Denies: discharge, dysuria, frequency, hematuria, hesitation, nocturia, pain, 
urgency. 
Musculoskeletal:
Reports: joint pain (DJD).  
Denies: back pain, gout, joint swelling, muscle pain, muscle stiffness, neck 
pain. 
Skin:
Denies: cysts, change in skin color, change in hair/nails, dryness, erythema, 
jaundice, lesions, lymphangitis, lumps, moles, rash. 
Neurological/Psychological:
Denies: anxiety, ataxia, cognitive dysfunction, confusion, depressed, dementia, 
emotional problems, headache, numbness, paresthesia, pre-existing deficit, petit
mal seizures, tingling, tremors, tonic-clonic seizures, unable to move lower ext
, unable to move upper ext, weakness. 
Hematologic/Endocrine:
Denies: bruising, bleeding, polyuria, polydipsia. 
Immunologic/Allergic:
Denies: splenectomy, HIV/AIDS, lymphadenopathy. 
All Other Systems: Reviewed and Negative
 
 
Objective
Vital Signs and I&Os
Vital Signs
 
 
Date Time Temp Pulse Resp B/P B/P Pulse O2 O2 Flow FiO2
 
     Mean Ox Delivery Rate 
 
05/05 0820 98.4 60 18 140/72  97 Room Air  
 
05/04 2216 99.4 65 18 132/70  97 Room Air  
 
05/04 1543 98.2 55 18 152/82  98   
 
 
 Intake & Output
 
 
 05/05 1600 05/05 0400 05/04 1600 05/04 0400 05/03 1600 05/03 0400
 
Intake Total  340 2330 550
 
Output Total     1000 
 
Balance  340 1330 550
 
       
 
Intake,   100  1250 250
 
Intake, Oral  340 1080 300
 
Number     0 0
 
Bowel      
 
Movements      
 
Output, Urine     1000 
 
Patient   236 lb  238 lb 240 lb
 
Weight      
 
Weight   Standing Scale  Chair scale Reported by Patient
 
Measurement      
 
Method      
 
 
 
Physical Exam:
Well-developed, well-nourished, obese right handed female, in NAD. Sclera 
anicteric. Conjunctiva pink.  No exophthalmos.  No lid lag.  Oropharynx clear. 
No oral thrush.  No aphthous ulcers.  There is no adenopathy, thyromegaly, or 
JVD.  No peripheral stigmata of inflammatory bowel disease or chronic liver 
disease on exam.  No spiders on the anterior chest wall. breast & pelvic exams: 
API. No CVA tenderness.  Lungs: clear to A&P, with slight decreased BS at the 
bases B/L.  Heart exam: regular rate rhythm, S1 and S2, without any murmur, rubs
, or gallops.  Abdominal exam: normal bowel sounds, soft obese belly, scant LLQ 
tenderness, without guarding or rebound.  No mass.  No organomegaly.  No fluid 
shift.  No pulsatile mass.  No epigastric bruit.  Old scars.  No palpable 
hernia.  Digital rectal exam: deferred by myself, as OB-negative API on 05/02/
2017. Extremities: without C, C, or E. +DJD.  No palpable cords.  No palmar 
erythema.  No Dupuytren's contractures.  Distal pulses 2+ bilaterally.  DTRs 2+ 
bilaterally.  Alert and oriented x 3.  No cogwheeling or tremor, post Reglan.  
No asterixis. CN II-XII intact. Motor 5/5 B/L. 
Current Medications:
 Current Medications
 
 
  Sig/Bridgett Start time  Last
 
Medication Dose Route Stop Time Status Admin
 
Acetaminophen 650 MG Q6P PRN 05/02 1745 AC 
 
  PO   
 
Ampicillin Sodium/ 3,000 MG Q6 05/02 1833 DC 05/05
 
Sulbactam Sodium  IV   0653
 
Sodium Chloride 100 ML    
 
Ciprofloxacin 250 MG BID 05/05 1000 AC 
 
  PO 05/09 0959  
 
Clopidogrel Bisulfate 75 MG DAILY 05/03 1215 AC 05/04
 
  PO   0915
 
Dextrose 25 GM ONCE ONE 05/05 0645 DC 05/05
 
  IV 05/05 0646  0654
 
Dextrose 25 GM ONCE ONE 05/05 0145 DC 05/05
 
  IV 05/05 0146  0140
 
Dextrose 12.5 GM ONCE ONE 05/04 2145 DC 05/04
 
  IV 05/04 2146  2158
 
Dextrose 25 GM ONCE ONE 05/04 1600 DC 05/04
 
  IV 05/04 1601  1603
 
Dextrose 25 GM ONCE ONE 05/04 1145 DC 05/04
 
  IV 05/04 1146  1120
 
Dextrose 25 GM ONCE ONE 05/04 1015 DC 05/04
 
  IV 05/04 1016  0800
 
Dextrose/Sodium  1,000 ML Q13H 05/05 0645 AC 05/05
 
Chloride  IV 05/05 1944  0824
 
Hydromorphone HCl 0.6 MG Q4P PRN 05/02 1900 AC 05/05
 
  IV   0824
 
Metronidazole 500 MG Q8 05/05 0841 AC 
 
  PO   
 
Multivitamins  1 TAB DAILY 05/03 1000 AC 05/04
 
Therapeutic  PO   0915
 
Omeprazole 40 MG DAILY AC 05/04 0700 AC 05/05
 
  PO   0653
 
Ondansetron HCl 4 MG Q6P PRN 05/02 1745 AC 
 
  IV   
 
Oxycodone/ 1 TAB Q6P PRN 05/02 1900 AC 
 
Acetaminophen  PO   
 
Patient Medication  1 ED .STK-MED ONE 05/04 1357 DC 
 
Teaching  ED 05/04 1358  
 
 
 
 
Results
Pertinent Lab Results:
 Laboratory Tests
 
 
 05/05 05/04 05/03 05/03
 
 0610 0645 1150 1013
 
Chemistry    
 
  Sodium (137 - 145 mmol/L) 138 137  
 
  Potassium (3.5 - 5.1 mmol/L) 3.6 4.1  
 
  Chloride (98 - 107 mmol/L) 104 106  
 
  Carbon Dioxide (22 - 30 mmol/L) 25 21  L  
 
  Anion Gap (5 - 16) 9 10  
 
  BUN (7 - 17 mg/dL) 9 9  
 
  Creatinine (0.5 - 1.0 mg/dL) 0.6 0.6  
 
  Estimated GFR (>60 ml/min) > 60 > 60  
 
  BUN/Creatinine Ratio (7 - 25 %) 15.0 15.0  
 
Coagulation    
 
  APTT (25 - 37 SEC)   53  H 
 
Urines    
 
  Urinalysis    LIGHT  H
 
  Urine Color (YEL,AMB,STR)    YEL
 
  Urine Clarity (CLEAR)    HAZY  H
 
  Urine pH (5.0 - 8.0)    6.5
 
  Ur Specific Gravity (1.001 - 1.035)    1.025
 
  Urine Protein (NEG,<30 MG/DL)    TRACE  H
 
  Urine Ketones (NEG)    15  H
 
  Urine Nitrite (NEG)    NEG
 
  Urine Bilirubin (NEG)    NEG@ICTO
 
  Urine Urobilinogen (0.1  -  1.0 EU/dl)    0.2
 
  Ur Leukocyte Esterase (NEG)    NEG
 
  Ur Microscopic    SEDIMENT EXAMINED
 
  Urine RBC (0 - 5 /HPF)    15-25  H
 
  Urine WBC (0 - 2 /HPF)    10-15  H
 
  Ur Epithelial Cells (NONE,FEW)    MANY  H
 
  Urine Bacteria (NEG/NONE)    MANY  H
 
  Urine Hemoglobin (NEG)    LARGE  H
 
  Urine Glucose (N MG/DL)    NEG
 
 
 
 
 05/03 05/03 05/02
 
 0700 0010 1830
 
Chemistry   
 
  Sodium (137 - 145 mmol/L) 137  
 
  Potassium (3.5 - 5.1 mmol/L) 3.4  L  
 
  Chloride (98 - 107 mmol/L) 106  
 
  Carbon Dioxide (22 - 30 mmol/L) 20  L  
 
  Anion Gap (5 - 16) 11  
 
  BUN (7 - 17 mg/dL) 10  
 
  Creatinine (0.5 - 1.0 mg/dL) 0.5  
 
  Estimated GFR (>60 ml/min) > 60  
 
  BUN/Creatinine Ratio (7 - 25 %) 20.0  
 
  Troponin I (< 0.11 ng/ml) 0.22 *H 0.40 *H 0.19 *H
 
 
 
 
 05/02 05/02 05/02
 
 1422 1421 1014
 
Chemistry   
 
  Sodium (137 - 145 mmol/L) 140  
 
  Potassium (3.5 - 5.1 mmol/L) 3.8  
 
  Chloride (98 - 107 mmol/L) 105  
 
  Carbon Dioxide (22 - 30 mmol/L) 17  L  
 
  Anion Gap (5 - 16) 17  H  
 
  BUN (7 - 17 mg/dL) 15  
 
  Creatinine (0.5 - 1.0 mg/dL) 0.6  
 
  Estimated GFR (>60 ml/min) > 60  
 
  BUN/Creatinine Ratio (7 - 25 %) 25.0  
 
  Glucose (65 - 99 mg/dL) 137  H  
 
  Lactic Acid (0.7 - 2.1 mmol/L) 1.8  
 
  Calcium (8.4 - 10.2 mg/dL) 9.5  
 
  Magnesium (1.6 - 2.3 mg/dL) 1.8  
 
  Total Bilirubin (0.2 - 1.3 mg/dL) 0.7  
 
  Direct Bilirubin (< 0.4 mg/dL) 0.4  
 
  AST (14 - 36 U/L) 18  
 
  ALT (9 - 52 U/L) 35  
 
  Alkaline Phosphatase (<127 U/L) 89  
 
  Troponin I (< 0.11 ng/ml) 0.08  
 
  Total Protein (6.3 - 8.2 g/dL) 7.8  
 
  Albumin (3.5 - 5.0 g/dL) 4.6  
 
  Globulin (1.9 - 4.2 gm/dL) 3.2  
 
  Albumin/Globulin Ratio (1.1 - 2.2 %) 1.4  
 
  Amylase (30 - 110 U/L) 47  
 
  Lipase (23 - 300 U/L) 103  
 
  Total Beta HCG (NEGATIVE) NEGATIVE  
 
Coagulation   
 
  PT (9.4 - 12.5 SEC)  11.0 
 
  INR (0.90 - 1.19)  1.05 
 
  APTT (25 - 37 SEC)  32 
 
  D-Dimer (70 - 232 ng/ml)  615  H 
 
Hematology   
 
  CBC w Diff NO MAN DIFF REQ  
 
  WBC (4.8 - 10.8 /CUMM) 10.4  
 
  RBC (4.20 - 5.40 /CUMM) 5.00  
 
  Hgb (12.0 - 16.0 G/DL) 15.1  
 
  Hct (37 - 47 %) 45.1  
 
  MCV (81.0 - 99.0 FL) 90.3  
 
  MCH (27.0 - 31.0 PG) 30.2  
 
  RDW (11.5 - 14.5 %) 15.6  H  
 
  Plt Count (130 - 400 /CUMM) 234  
 
  MPV (7.4 - 10.4 FL) 10.6  H  
 
  Gran % (42.2 - 75.2 %) 82.6  H  
 
  Lymphocytes % (20.5 - 51.1 %) 13.6  L  
 
  Monocytes % (1.7 - 9.3 %) 3.2  
 
  Eosinophils % (0 - 5 %) 0.2  
 
  Basophils % (0.0 - 2.0 %) 0.4  
 
  Absolute Granulocytes (1.4 - 6.5 /CUMM) 8.6  H  
 
  Absolute Lymphocytes (1.2 - 3.4 /CUMM) 1.4  
 
  Absolute Monocytes (0.10 - 0.60 /CUMM) 0.3  
 
  Absolute Eosinophils (0.0 - 0.7 /CUMM) 0  
 
  Absolute Basophils (0.0 - 0.2 /CUMM) 0  
 
  PUBS MCHC (33.0 - 37.0 G/DL) 33.5  
 
Toxicology   
 
  Urine Opiates Screen (>2000 NG/ML)   2378.00  H
 
  Methadone Screen (>300 NG/ML)   < 40
 
  Barbiturate Screen (>200 NG/ML)   < 60
 
  Ur Phencyclidine Scrn (>25 NG/ML)   < 6.00
 
  Amphetamines Screen (>1000 NG/ML)   < 100
 
  U Benzodiazepines Scrn (>200 NG/ML)   < 85
 
  Urine Cocaine Screen (>300 NG/ML)   < 50
 
  Urine Cannabis Screen (>50 NG/ML)   75.00  H
 
 
 
Imaging/Other Studies:
05/02/2017: EKG- SB @ 56, normal axis, borderline prolonged QT interval, QS in 
III
 
05/03/2017: EKG- NSR @ 66, old IWMI, prolonged QT interval.
 
05/02/2017:  CT ABD & PELVIS W ORAL & IV CONTRAST-
1. Small focus of diverticulitis involving the distal descending colon.  No 
abscess, free air, or perforation.  Normal appendix.  Normal small bowel loops.
2. Status post gastric bypass.
3. Status post cholecystectomy.  CBD 1 cm postop.  No choledocholithiasis.
4.  Stable 2 cm hypodensity left lobe liver, previously felt to be a hemangioma 
(noted on 
04/26/2016: sono)
5. Nonobstructive 3 mm stone upper pole of the right kidney.
6.  2.4 cm right adnexal follicular cyst.  No ascites.  Retroverted uterus.
7.  DJD.
 
05/02/2017: XRY-CHEST XRAY, PA AND LATERAL-
No acute abnormality.
 
05/03/2017: *CT ANGIOGRAM OF THE CHEST WITH CONTRAST (CT PULMONARY ANGIOGRAM FOR
PE)-
No evidence of pulmonary embolism.
 
05/04/2017: ECHOCARDIOGRAM-
1. The aortic valve is trileafle and normal. 
2. The mitral valve is anatomically normal with minimal to mild  
 mitral insufficiency. 
3. There is no pericardial fluid present. 
4. The left ventricular chamber size and systolic function are  
 normal, with LVEF 60-65%. 
5. Minimal to mild tricuspid and pulmonic insufficiency are present  
 with no evidence of pulmonary hypertension.

## 2017-05-06 VITALS — SYSTOLIC BLOOD PRESSURE: 126 MMHG | DIASTOLIC BLOOD PRESSURE: 70 MMHG

## 2017-05-06 VITALS — DIASTOLIC BLOOD PRESSURE: 58 MMHG | SYSTOLIC BLOOD PRESSURE: 98 MMHG

## 2017-05-06 VITALS — SYSTOLIC BLOOD PRESSURE: 110 MMHG | DIASTOLIC BLOOD PRESSURE: 80 MMHG

## 2017-05-06 NOTE — PN- CARDIOLOGY
Subjective
Subjective:
The patient's comfortable.  No chest pain.  No palpitations.  No new cardiac 
complaints.  No lightheadedness or dizziness.
 
Objective
Vital Signs and I&Os
Vital Signs
 
 
Date Time Temp Pulse Resp B/P B/P Pulse O2 O2 Flow FiO2
 
     Mean Ox Delivery Rate 
 
05/06 0756 98.5 58 14 110/80  97 Room Air  
 
05/06 0021 98.2 56 20 126/70  95 Room Air  
 
05/05 1637 98.2 61 18 152/80  99 Room Air  
 
 
 Intake & Output
 
 
 05/06 1600 05/06 0800 05/06 0000 05/05 1600 05/05 0800 05/05 0000
 
Intake Total 480 120 550 680 540 520
 
Output Total      
 
Balance 480 120 550 680 540 520
 
       
 
Intake, IV   100 200 240 
 
Intake, Oral 480 120 450 480 300 520
 
Patient   236 lb   
 
Weight      
 
 
 
Physical Exam:
 
 
Gen: NAD
HEENT: normal
Lungs: clear to auscultation, normal resp. effort
Heart: RRR, S1, S2, no murmurs
Abdomen: Soft, nontender, no masses
Extremities: 1+ edema
Neuro: Alert and oriented x 3, cranial nerves intact
Current Medications:
 Current Medications
 
 
  Sig/Bridgett Start time  Last
 
Medication Dose Route Stop Time Status Admin
 
Acetaminophen 650 MG Q6P PRN 05/02 1745 AC 
 
  PO   
 
Ciprofloxacin 250 MG BID 05/05 1000 AC 05/06
 
  PO 05/09 0959  0946
 
Clopidogrel Bisulfate 75 MG DAILY 05/03 1215 AC 05/06
 
  PO   0946
 
Cosyntropin 0.25 MG ONE ONE 05/07 0600 AC 
 
  IV 05/07 0601  
 
Hydromorphone HCl 0.6 MG Q4P PRN 05/02 1900 AC 05/06
 
  IV   1400
 
Metronidazole 500 MG Q8 05/05 0841 AC 05/06
 
  PO   1400
 
Multivitamins  1 TAB DAILY 05/03 1000 AC 05/06
 
Therapeutic  PO   0946
 
Omeprazole 40 MG DAILY AC 05/04 0700 AC 05/06
 
  PO   0601
 
Ondansetron HCl 4 MG Q6P PRN 05/02 1745 AC 05/06
 
  IV   0819
 
Oxycodone/ 1 TAB Q6P PRN 05/02 1900 AC 
 
Acetaminophen  PO   
 
Polyethylene Glycol 17 GM DAILY 05/06 1441 AC 
 
  PO   
 
Potassium Chloride 20 MEQ ONCE ONE 05/06 1545 DC 
 
  PO 05/06 1546  
 
Potassium Chloride 40 MEQ ONCE ONE 05/06 1015 DC 05/06
 
  PO 05/06 1016  1400
 
Senna/Docusate Sodium 1 TAB BID 05/06 1442 AC 
 
  PO   
 
 
 
 
Results
Last 48 Hrs of Labs/Mics:
 Laboratory Tests
 
05/06/17 0645:
C-Peptide Pending
 
05/06/17 0645:
Anion Gap 12, Estimated GFR > 60, BUN/Creatinine Ratio 16.7, Glucose 72, Insulin
Level 6.8, Magnesium 1.9, Cortisol AM Sample 5.3
 
05/05/17 0610:
Anion Gap 9, Estimated GFR > 60, BUN/Creatinine Ratio 15.0, Insulin Level 4.1, 
Magnesium 1.5  L, Cortisol AM Sample 3.2  L
 
 
Assessment/Plan
Assessment/Plan
1.  Status post gastric bypass surgery
2.  Mild troponin elevation, likely demand ischemia
3.  Hypertension
4.  Premature atrial contractions
5.  Hypokalemia and hypomagnesemia
 
Plan:
* Continue current cardiac medications.
* Check electrolytes in the morning.
Continue telemetry? No

## 2017-05-06 NOTE — PN- ENDOCRINOLOGY
Assessment/Plan
Assessment:
47 female with past medical history significant for morbid obesity s/p 
laparoscopic gastric bypass on 02/22/2017 with hiatal hernia repair, 
qapproximately 50 pounds weight loss post surgery, history of hypertension, GERD
, was admitted for abdominal pain, nausea, vomiting, diarrhea secondary to acute
diverticulitis.  Patient also had positive troponins, hypokalemia and 
hypomagnesemia. After she was started on Bariatric stage V diet, she had 
hypoglycemia with glucose level in the 50s after she ate Greek yogurt which has 
only 6 grams of sugar.
 
Dx: Hypoglycemia s/p gastric bypass approximately 2 months ago-- etiology 
unclear at this point.
 
recommended:
1. monitor FSGs before each meal, two hours after each meal, bedtime, midnight 
and 3 am.
2. recommend 3 small meals and 3 snacks between meals;
 ---complex carbohydrates 30 grams per meal
 ---complex carbohydrates 15 grams per snack
 ---encourage eating protein
 ---avoid simple carbohydrates
3. if FSG is < 60, please draw blood sample stat to check plasma glucose, 
insulin, c-peptide and random cortisol.
 
Her FSGs were 96, 89, 90, 89, 99, 103, 82, 74, 93, 82 and 75.
 
Plan:
1. continue the current management;
   continue monitoring FSGs.
   will follow.
 
2. am cortisol was 3.2 and 5.3, which is low.
recommend repeating am cortisol and checking ACTH tomorrow morning
recommend having ACTH stimulation test done tomorrow morning.
 
Subjective
Subjective:
Over past 24 hours, her glucose level has been stable.
 
Objective
Last 24 Hrs of Vital Signs/I&O
 Vital Signs
 
 
Date Time Temp Pulse Resp B/P B/P Pulse O2 O2 Flow FiO2
 
     Mean Ox Delivery Rate 
 
05/06 0756 98.5 58 14 110/80  97 Room Air  
 
05/06 0021 98.2 56 20 126/70  95 Room Air  
 
05/05 1637 98.2 61 18 152/80  99 Room Air  
 
 
 Intake & Output
 
 
 05/06 1600 05/06 0800 05/06 0000
 
Intake Total  120 550
 
Output Total   
 
Balance  120 550
 
    
 
Intake, IV   100
 
Intake, Oral  120 450
 
Patient   236 lb
 
Weight   
 
 
 
 
Results
Pertinent Lab/Devaughn Results:
 Laboratory Tests
 
 
 05/06 05/06
 
 0645 0645
 
Chemistry  
 
  Sodium (137 - 145 mmol/L)  138
 
  Potassium (3.5 - 5.1 mmol/L)  3.7
 
  Chloride (98 - 107 mmol/L)  104
 
  Carbon Dioxide (22 - 30 mmol/L)  21  L
 
  Anion Gap (5 - 16)  12
 
  BUN (7 - 17 mg/dL)  10
 
  Creatinine (0.5 - 1.0 mg/dL)  0.6
 
  Estimated GFR (>60 ml/min)  > 60
 
  BUN/Creatinine Ratio (7 - 25 %)  16.7
 
  Insulin Level (3.0 - 25.0 mIU/mL)  Pending
 
  C-Peptide Pending 
 
  Magnesium (1.6 - 2.3 mg/dL)  1.9
 
  Cortisol AM Sample (4.46 - 22.7 ug/dL)  5.3

## 2017-05-06 NOTE — PN- HOUSESTAFF
**See Addendum**
Subjective
Follow-up For:
1.Nausea, vomiting, diarrhea: Recent gastric bypass surgery
2. Elevated troponins: No evidence. Normal recent pharmacological stress test
3.  PACs on telemetry 
4.  Hypertension
5.  Hypomagnesemia
6.  Mild Hypokalemia
Complaints: no complaints
Tele-Events Since Last Visit:
Sinus Bradycardia 50s-58s
non specific ST, T wave changes 
Subjective:
Patient was visted and examined this moning. She denies any chest pain, 
shortness of breath, palpitation, vomiting and diarrhea, and abdominal pain.  
Patient had soup last night and tolerated it very well.  This morning after 
having 3 bites of her breakfast she felt nauseous, which resolved after 
receiving Zofran.  The remainder of the review of system was unremarkable.  
Vital signs were reviewed.
Biochemistry: Sodium 138 potassium 3.7 improved from 3.6, magnesium level for 
this morning insulin level and C-peptide level are pending. 
Echo in 05/04/2017:Normal global left ventricular size, wall thickness, systolic
 
 function with no obvious regional wall motion abnormalities. Normal  
 left ventricular ejection fraction estimated at 60-65%. 
 
 
Review of Systems
Constitutional:
Reports: no symptoms. 
EENTM:
Reports: no symptoms. 
Cardiovascular:
Reports: no symptoms. 
Gastrointestinal:
Reports: nausea.  Denies: abdominal pain, bloating, constipation, diarrhea, 
distention, bowel incontinence, melena, bloody stool, changes in stool, vomiting
, steatorrhea. 
Genitourinary:
Reports: no symptoms. 
Musculoskeletal:
Reports: no symptoms. 
Skin:
Reports: no symptoms. 
 
Objective
Last 24 Hrs of Vital Signs/I&O
 Vital Signs
 
 
Date Time Temp Pulse Resp B/P B/P Pulse O2 O2 Flow FiO2
 
     Mean Ox Delivery Rate 
 
05/06 0756 98.5 58 14 110/80  97 Room Air  
 
05/06 0021 98.2 56 20 126/70  95 Room Air  
 
05/05 1637 98.2 61 18 152/80  99 Room Air  
 
 
 Intake & Output
 
 
 05/06 1600 05/06 0800 05/06 0000
 
Intake Total  120 550
 
Output Total   
 
Balance  120 550
 
    
 
Intake, IV   100
 
Intake, Oral  120 450
 
Patient   236 lb
 
Weight   
 
 
 
 
Physical Exam
General Appearance: Alert, Oriented X3, Cooperative
Skin: No Rashes
HEENT: Atraumatic, PERRLA, EOMI, Mucous Membr. moist/pink
Cardiovascular: Normal S1, Normal S2, No Murmurs, Gallops, Rubs
Lungs: Clear to Auscultation, Normal Air Movement
Abdomen: mild left lower Q abdominal tenderness
Neurological: Normal Speech
Extremities: No Clubbing, No Cyanosis, No Edema
Current Medications:
 Current Medications
 
 
  Sig/Bridgett Start time  Last
 
Medication Dose Route Stop Time Status Admin
 
Acetaminophen 650 MG Q6P PRN 05/02 1745 AC 
 
  PO   
 
Ciprofloxacin 250 MG BID 05/05 1000 AC 05/06
 
  PO 05/09 0959  0946
 
Clopidogrel Bisulfate 75 MG DAILY 05/03 1215 AC 05/06
 
  PO   0946
 
Dextrose/Sodium  1,000 ML Q13H 05/05 0645 DC 05/05
 
Chloride  IV 05/05 1644  0824
 
Hydromorphone HCl 0.6 MG Q4P PRN 05/02 1900 AC 05/06
 
  IV   0852
 
Magnesium Sulfate 1 GM Q2H 05/05 1030 DC 05/05
 
Dextrose/Water 100 ML IV 05/05 1429  1330
 
Metronidazole 500 MG Q8 05/05 0841 AC 05/06
 
  PO   0601
 
Multivitamins  1 TAB DAILY 05/03 1000 AC 05/06
 
Therapeutic  PO   0946
 
Omeprazole 40 MG DAILY AC 05/04 0700 AC 05/06
 
  PO   0601
 
Ondansetron HCl 4 MG Q6P PRN 05/02 1745 AC 05/06
 
  IV   0819
 
Oxycodone/ 1 TAB Q6P PRN 05/02 1900 AC 
 
Acetaminophen  PO   
 
Potassium Chloride 40 MEQ ONCE ONE 05/05 1330 DC 05/05
 
  PO 05/05 1331  1407
 
Potassium Chloride 20 MEQ Q10H 05/05 1030 DC 
 
Dextrose/Sodium  1,000 ML IV 05/05 2029  
 
Chloride     
 
 
 
 
Last 24 Hrs of Lab/Devaughn Results
Last 24 Hrs of Labs/Mics:
 Laboratory Tests
 
05/06/17 0645:
C-Peptide Pending
 
05/06/17 0645:
Anion Gap 12, Estimated GFR > 60, BUN/Creatinine Ratio 16.7, Insulin Level 
Pending, Magnesium Pending, Cortisol AM Sample 5.3
 
 
Assessment/Plan
Assessment:
Ms Taveras is a 47-year-old woman  who has a past medical history of hypertension, 
obesity (gastric bypass surgery done recently).  She is being evaluated of near 
loss of consciousness 1 day.
 
Below is the problem list and plan:
 
#1 nausea, vomiting, diarrhea-likely, common etiology is viral gastroenteritis. 
Patient's nausea vomiting and diarrhea improved.  Plan is to continue oral 
ciprofloxacin.  Patient's electrolyte abnormality has been repleted, however she
remained hypokalemic.  We are repleting potassium. Magnesium levels are pending 
for today.
 
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  Pending insulin 
level and C-peptide level and cortisol level
#2 hypertension- Currently off medications.
 
#3 presyncope-monitor the patient on telemetry.  Monitor for arrhythmias, heart 
block.  Elevated troponins w/ non specific T wave changes. Initially started on 
iv heparin, which was discontinued after speaking to Dr. Jean. Pt was already
given aspirin, but plan to start on plavix in view of gastric bypass and 
increase incidence of ulcers. Discussed w/ Dr Frausto who is agreeable to have 
the pt on plavix. 
 
#4 DVT prophylaxis-pharmacological.
 
#5 pain management.  Her cassettes 1 tab every 6 as needed for mild-to-moderate 
pain
Hydromorphone 0.6 mg IV every 6 for severe pain
Problem List:
 1. Diverticulosis
 
 2. Elevated troponin
 
 3. Gastric bypass status for obesity
 
 4. Morbid obesity
 
 5. Abdominal pain
 
 6. Nausea and vomiting in adult
 
 7. Diarrhea
 
 8. Syncope
 
Pain Rating: 3
Pain Location:
LLQ
Pain Goal: Pain 4 or less
Pain Plan:
Dilaudid and Percocet 
Tomorrow's Labs & Rationales:
BEP

## 2017-05-07 VITALS — DIASTOLIC BLOOD PRESSURE: 72 MMHG | SYSTOLIC BLOOD PRESSURE: 130 MMHG

## 2017-05-07 VITALS — DIASTOLIC BLOOD PRESSURE: 66 MMHG | SYSTOLIC BLOOD PRESSURE: 120 MMHG

## 2017-05-07 VITALS — DIASTOLIC BLOOD PRESSURE: 70 MMHG | SYSTOLIC BLOOD PRESSURE: 116 MMHG

## 2017-05-07 VITALS — DIASTOLIC BLOOD PRESSURE: 82 MMHG | SYSTOLIC BLOOD PRESSURE: 138 MMHG

## 2017-05-07 NOTE — PN- ENDOCRINOLOGY
Assessment/Plan
Assessment:
47 female with past medical history significant for morbid obesity s/p 
laparoscopic gastric bypass on 02/22/2017 with hiatal hernia repair, 
qapproximately 50 pounds weight loss post surgery, history of hypertension, GERD
, was admitted for abdominal pain, nausea, vomiting, diarrhea secondary to acute
diverticulitis.  Patient also had positive troponins, hypokalemia and 
hypomagnesemia. After she was started on Bariatric stage V diet, she had 
hypoglycemia with glucose level in the 50s after she ate Greek yogurt which has 
only 6 grams of sugar.
 
Dx: Hypoglycemia s/p gastric bypass approximately 2 months ago-- etiology 
unclear at this point.
 
recommended:
1. monitor FSGs before each meal, two hours after each meal, bedtime, midnight 
and 3 am.
2. recommend 3 small meals and 3 snacks between meals;
 ---complex carbohydrates 30 grams per meal
 ---complex carbohydrates 15 grams per snack
 ---encourage eating protein
 ---avoid simple carbohydrates
3. if FSG is < 60, please draw blood sample stat to check plasma glucose, 
insulin, c-peptide and random cortisol.
 
Her FSGs were 75, 83, 95, 104, 86, 73 and 80.
 
Repeat am cortisol was 10. ACTH stimulation test was done this morning and 
cortisol after 30 mins was 21 and cortisol after 60 mins was  29.9 which are in 
the normal range.
 
Plan:
continue the current diet;
continue monitoring FSGs.
will follow.
 
Subjective
Subjective:
Patient has no special complaints this morning.
 
Objective
Last 24 Hrs of Vital Signs/I&O
 Vital Signs
 
 
Date Time Temp Pulse Resp B/P B/P Pulse O2 O2 Flow FiO2
 
     Mean Ox Delivery Rate 
 
05/07 0759 98.4 56 14 120/66  97 Room Air  
 
05/07 0052 98.8 68 20 138/82  98 Room Air  
 
05/06 1530 98.3 95 18 98/58  95 Room Air  
 
 
 Intake & Output
 
 
 05/07 1600 05/07 0800 05/07 0000
 
Intake Total  400 600
 
Output Total   
 
Balance  400 600
 
    
 
Intake, Oral  400 600
 
Number  1 
 
Bowel   
 
Movements   
 
 
 
 
Results
Pertinent Lab/Devaughn Results:
 Laboratory Tests
 
 
 05/07 05/07 05/07 05/07
 
 0710 0630 0555 0555
 
Chemistry    
 
  Sodium (137 - 145 mmol/L)    139
 
  Potassium (3.5 - 5.1 mmol/L)    4.1
 
  Chloride (98 - 107 mmol/L)    107
 
  Carbon Dioxide (22 - 30 mmol/L)    21  L
 
  Anion Gap (5 - 16)    11
 
  BUN (7 - 17 mg/dL)    16
 
  Creatinine (0.5 - 1.0 mg/dL)    0.5
 
  Estimated GFR (>60 ml/min)    > 60
 
  BUN/Creatinine Ratio (7 - 25 %)    32.0  H
 
  Cortisol AM Sample (4.46 - 22.7 ug/dL) 29.9  H 21.0 10.0

## 2017-05-07 NOTE — PN- HOUSESTAFF
ROSALIA LOPEZ,[facility] 17 0846:
Subjective
Follow-up For:
1.  Nausea, vomiting, diarrhea: Recent gastric bypass surgery (2017)
2.  Elevated troponins: Demand Ischemia
3.  PACs on telemetry 
4.  Hypertension
5.  Hypomagnesemia
6.  Mild Hypokalemia
 
Tele-Events Since Last Visit:
NSR and SB 55-65, HR 45 @ 5:28 am
 
Subjective:
I had a very long conversation with the patient addressing her concerns 
regarding her condition and association with gastric bypass. She reported that 
nausea and vomiting have gone completely. She did have mild LLQ pain, after 
having brown rice which she had for the first time, especially since gastric 
bypass. She still has intermittent mild lightheadedness but not all abdirahman time and
not assiciated with position. No chest pains, fever, chills, SOB.
 
 
Review of Systems
Constitutional:
Reports: see HPI. 
Cardiovascular:
Reports: no symptoms. 
Respiratory:
Reports: no symptoms. 
Gastrointestinal:
Reports: see HPI. 
Genitourinary:
Reports: no symptoms. 
Musculoskeletal:
Reports: no symptoms. 
 
Objective
Last 24 Hrs of Vital Signs/I&O
 Vital Signs
 
 
Date Time Temp Pulse Resp B/P B/P Pulse O2 O2 Flow FiO2
 
     Mean Ox Delivery Rate 
 
 0759 98.4 56 14 120/66  97 Room Air  
 
 0052 98.8 68 20 138/82  98 Room Air  
 
 1530 98.3 95 18 98/58  95 Room Air  
 
 
 Intake & Output
 
 
  1600  0800  0000
 
Intake Total  400 600
 
Output Total   
 
Balance  400 600
 
    
 
Intake, Oral  400 600
 
Number  1 
 
Bowel   
 
Movements   
 
 
 
 
Physical Exam
General Appearance: Alert, Oriented X3, Cooperative, No Acute Distress
Cardiovascular: Regular Rate, Normal S1, Normal S2, No Murmurs
Lungs: Clear to Auscultation, Normal Air Movement
Abdomen: Normal Bowel Sounds, Soft, No Tenderness, No Hepatospenomegaly
Neurological: Normal Gait, Normal Speech, Strength at 5/5 X4 Ext, Normal Tone, 
Sensation Intact
Extremities: No Edema, Normal Pulses
Current Medications:
 Current Medications
 
 
  Sig/Bridgett Start time  Last
 
Medication Dose Route Stop Time Status Admin
 
Acetaminophen 650 MG Q6P PRN  1745 AC 
 
  PO   
 
Ciprofloxacin 250 MG BID  1000 AC 
 
  PO  0959  0857
 
Clopidogrel Bisulfate 75 MG DAILY  1215 AC 
 
  PO   0857
 
Cosyntropin 0.25 MG ONE ONE  0600 DC 
 
  IV  0601  0557
 
Hydromorphone HCl 0.6 MG Q4P PRN  1900 AC 
 
  IV   0857
 
Magnesium Oxide 400 MG ONE ONE  1915 DC 
 
  PO  1916  
 
Metronidazole 500 MG Q8  0841 AC 
 
  PO   0554
 
Multivitamins  1 TAB DAILY  1000 AC 
 
Therapeutic  PO   0857
 
Omeprazole 40 MG DAILY AC  0700 AC 
 
  PO   0554
 
Ondansetron HCl 4 MG Q6P PRN  1745 AC 
 
  IV   0819
 
Oxycodone/ 1 TAB Q6P PRN  1900 AC 
 
Acetaminophen  PO   
 
Polyethylene Glycol 17 GM DAILY  1441 AC 
 
  PO   1642
 
Potassium Chloride 20 MEQ ONCE ONE  1545 DC 
 
  PO  1546  1642
 
Senna/Docusate Sodium 1 TAB BID  1442 AC 
 
  PO   0857
 
 
 
 
Last 24 Hrs of Lab/Devaughn Results
Last 24 Hrs of Labs/Mics:
 Laboratory Tests
 
17 0710:
Cortisol AM Sample 29.9  H
 
17 0630:
Cortisol AM Sample 21.0
 
17 0555:
Cortisol AM Sample 10.0
 
17 0555:
Anion Gap 11, Estimated GFR > 60, BUN/Creatinine Ratio 32.0  H
 
 
Assessment/Plan
Assessment:
Ms Taveras is a 47-year-old woman who has a past medical history of hypertension, 
obesity (gastric bypass surgery done in 2017), admitted for near syncope, 
araceli cardia and hypoglycemia.
 
Assessment and Plan:
 
1. Nausea, vomiting, diarrhea: 
- Patient's nausea vomiting and diarrhea improved.  
- Is being treated withoral ciprofloxacin for diverticulitis
- Electrolyte abnormalities have been resolved
 
2. Late dumping syndrome:
- suspicious of hyper insulinemia.  Pending C-peptide level,. Insulin level 6.8
- Cosyntropic test within normal limits with repeat am cortisol was 10. ACTH 
stimulation test was done this morning and cortisol after 30 mins was 21 and 
cortisol after 60 mins was  29.9.
-  FSGs were 75, 83, 95, 104, 86, 73 and 80.
-  Will follow Endo
- If FSG is < 60, will draw blood sample stat to check plasma glucose, insulin, 
c-peptide and random cortisol.
- Diet as recommended by Endo:3 small meals and 3 snacks between meals; with 
complex carbohydrates 30 grams per meal, complex carbohydrates 15 grams per 
snack, encourage eating protein, avoid simple carbohydrates
- monitor FSGs before each meal, two hours after each meal, bedtime, midnight 
and 3 am.
 
3. Hypertension:
- Will controlled off medications.
 
4. Presyncope:
- No arrythmias, or heart blocks, has had bradycardia. 
- Positive trops, trended down, cardiology evaluated, likely demand ischemia. No
evidence of myocardial infarction
- Initially started on IV heparin, which was discontinued after speaking to Dr. Jean. Pt was already given aspirin, but plan to start on plavix in view of 
gastric bypass and increase incidence of ulcers. Discussed w/ Dr Frausto who is
agreeable to have the pt on plavix. 
 
5. DVT prophylaxis-pharmacological.
 
Problem List:
 1. Elevated troponin
 
 2. Hypertension
 
 3. Nausea and vomiting in adult
 
Pain Ratin
Pain Location:
none
Pain Goal: Remain pain free
Pain Plan:
PRN Tylenol
Tomorrow's Labs & Rationales:
None
DVT/Prophylaxis: pharmacological
Consulting Request: 1 
   Consulting Specialty: Endocrinology
   Consulting Physician:
Hypoglycemia
   Reason for Consult: Dr. Orourke
Consulting Request: 2 
   Consulting Specialty: Cardiology
   Consulting Physician:
Mal Liz
   Reason for Consult: Bradycardia
 
 
BRYANNA LOPEZ,[facility] 17 1514:
Attending MD Review Statement
 
Attending Statement
Attending MD Statement: examined this patient, discuss w/resident/PA/NP, agreed 
w/resident/PA/NP, reviewed EMR data (avail), discussed with nursing, amended to 
note
Attending Assessment/Plan:
Patient seen and examined.  She reports abdominal cramping with dinner yesterday
but tolerated breakfast today.  Denies nausea vomiting.  Reports abdominal pain 
is controlled with just 6.  No significant events on telemetry monitoring 
overnight  Other than mild bradycardia.  Cardiology follow-up appreciated.  
Endocrinology follow-up noted.
 
Recommendations:
-Continue to advance diet as tolerated.
-Continue oral and 20 therapy for diverticulitis.
-Discontinue telemetry as recommended by the cardiology service.
-Begin discharge planning if the patient continues to tolerate diet.

## 2017-05-08 VITALS — DIASTOLIC BLOOD PRESSURE: 82 MMHG | SYSTOLIC BLOOD PRESSURE: 140 MMHG

## 2017-05-08 NOTE — DISCHARGE SUMMARY
**See Addendum**
Visit Information
 
Visit Dates
Admission Date:
05/02/17
 
Discharge Date:
05/08/17
 
 
Hospital Course
 
Course
Attending Physician:
NOHEMI VILLA MD
 
Primary Care Physician:
DESIRAE GRUBBS MD
 
Consulting Request:
   Consulting Specialty: Cardiology
   Consulting Physician:
Mal Liz
   Reason for Consult: Bradycardia
Hospital Course:
Ms Taveras is a 47-year-old woman  who has a past medical history of hypertension, 
obesity (gastric bypass surgery done recently).  She is being evaluated of near 
loss of consciousness 1 day.
 
At the time of admission, vitals 98.7, respiration 20, pulse rate 58, blood 
pressure 175/85 (improved later ), pulse ox 100% on room air.  Findings 
indicated WBC 10.4, hemoglobin 15.1, platelets 234, normal electrolytes-sodium 
140, potassium 3.8, anion gap 17, bicarbonate 17, BUN 15.  Creatinine 0.6, 
lactic acid 1.8, d-dimer 615, EKG revealed normal sinus rhythm, heart rate 51, 
slightly elevated T waves.
 
Radiological findings-chest x-ray negative for any acute process.  CT abdomen 
and pelvis with oral and IV contrast revealed small focus of diverticulitis 
involving the distal descending colon.  Also was found a nonobstructing 3 mm 
stone in the upper pole of the right kidney (chronic as per the patient).
 
Below is the problem list and plan:
 
#1 nausea, vomiting, diarrhea-likely, common etiology is viral gastroenteritis. 
Since the patient underwent gastric bypass, acute changes in microbiota could 
have possibly caused diarrhea or diverticulitis. For diverticultitis, she was 
started on Unasyn which was transitioned to po ciprofloxacin and metronidazole. 
Diet was advanced as tolerted. GI consultant, Dr. Parra was consulted for 
advice. Rohit work up, colonoscopy as an outpatient.
 
#3 presyncope-patient was monitored  closely on telemetry. Pt had elevated 
cardiac enzyme, troponin I peaked at 0.40. EKG revealed slightly peaked t wave, 
but did not have any STTWI. Since, peaked t wave, could be considered pre-ST 
changes, she was monitored closely, and was started on anticoagulation. It was 
discontinued, after monitoring. Pt was started on plavix, after speaking to Dr. Sung. 
 
#4 Persistent hypoglycemia- Pt did not have any dizziness or loss of 
consciousness while, she was in the hospital. When the diet was advanced after 
she was able to eat solid diet, she was found to require several iv injections/
boluses of glucose. She was found to be hypoglycemic, and tachycardic after she 
started po intake. Differential diagnosis included- late dumping syndrome(early 
onset) or hypoglycemia of unclear etiology. Diet was adjusted w/ 30gm complex 
carbohydrates per meal w/ 15gm of complex carb meal interspersed between. She 
did not have any further episodes of hypoglycemia or needed any further iv 
glucose injections. Cosyntropin test was negative ruling out any 
hypocortisolemia. Furhter workup was to be done as an outpatient. Dr Orourke was 
consulted for advice.   
Allergies:
Coded Allergies:
No Known Allergies (10/25/16)
 
Pertinent Lab Results:
CAT - CT ABD & PELVIS W ORAL & IV CO
 
1. Small focus of diverticulitis involving the distal descending colon.
2. Status post gastric bypass.
3. Status post cholecystectomy.
4. Nonobstructive 3 mm stone upper pole of the right kidney.
 
 
---------------------------------------------------------------------------
 
CT ANGIOGRAM OF THE CHEST WITH CONTRAST (CT PULMONARY ANGIOGRAM FOR PE)
 
No evidence of pulmonary embolism.
 
 
----------------------------------------------------------------------------
 
ECHOCARDIOGRAM
                                               
1. The aortic valve is trileafle and normal. 
 2. The mitral valve is anatomically normal with minimal to mild  
 mitral insufficiency. 
 3. There is no pericardial fluid present. 
 4. The left ventricular chamber size and systolic function are  
 normal. 
 6. Minimal to mild tricuspid and pulmonic insufficiency are present  
 with no evidence of pulmonary hypertension. 
 
 
 
Disposition Summary
 
Disposition
Principal Diagnosis:
Diverticulitis
Additional Diagnosis:
Hypoglycemia of unclear etiology
Gastric bypass complication
Discharge Disposition: home or self care
 
Discharge Instructions
 
General Discharge Information
Code Status: Full Code
Patient's Diet:
2. recommend 3 small meals and 3 snacks between meals;
--complex carbohydrates 30 grams per meal
--complex carbohydrates 15 grams per snack
--encourage eating protein
--avoid simple carbohydrates
Patient's Activity:
as tolerated.
Follow-Up Instructions/Appts:
1. Please see your PCP within one week of discharge. 
 
2. Please see your endocrinologist within one week of discharge. 
 
3. Please see your cardiologist within one week of discharge. 
 
4. Please follow the meal plan-
 
2. recommend 3 small meals and 3 snacks between meals;
--complex carbohydrates 30 grams per meal
--complex carbohydrates 15 grams per snack
--encourage eating protein
--avoid simple carbohydrates
 
Medications at Discharge
Discharge Medications:
Continue taking these medications:
Multivitamin (Multi-Day Vitamins) 1 EACH TABLET
    1 Tablet ORAL DAILY
    Comments:
       given 5/8/17 @ 1015
 
Ergocalciferol (Vitamin D2) (Vitamin D2) 50,000 UNIT CAPSULE
    1 Capsule ORAL EVERY MONDAY
    Qty = 4
    Comments:
       not given
 
Start taking the following new medications:
Clopidogrel Bisulfate (Plavix) 75 MG TABLET
    75 Milligram ORAL DAILY
    Days = 30
    No Refills
    Comments:
       given 5/8/17 @ 1015
 
Ciprofloxacin HCl (Cipro) 500 MG TABLET
    1 Tablet ORAL TWICE DAILY
    Qty = 3
    No Refills
    Comments:
       given 5/8/17 @ 1015
 
Metronidazole (Flagyl) 500 MG TABLET
    1 Tablet ORAL Q8H
    Days = 2
    No Refills
    Comments:
       given 5/8/17 @ 2:10 pm
 
Pantoprazole Sodium (Protonix) 20 MG TABLET.DR
    1 Tablet ORAL DAILY
    Qty = 30
    No Refills
    Comments:
       given prilosec in hospital 5/8/17 @ 0610
 
 
Copies To:
HUMERA LOPEZ,DESIRAE
 
Attending MD Review Statement
Documenting Attending:
NOHEMI VILLA MD

## 2017-05-08 NOTE — PN- HOUSESTAFF
Subjective
Follow-up For:
- diarrhea
- near syncope
Complaints: no complaints
Tele-Events Since Last Visit:
off tele
Subjective:
Pt comfortable. No complaints. Blood sugar levels > 70. Vitals stable. 
 
Review of Systems
Constitutional:
Reports: see HPI. 
 
Objective
Last 24 Hrs of Vital Signs/I&O
 Vital Signs
 
 
Date Time Temp Pulse Resp B/P B/P Pulse O2 O2 Flow FiO2
 
     Mean Ox Delivery Rate 
 
 0803 98.8 57 18 140/82  99 Room Air  
 
 2352 98.9 60 20 130/72  97 Room Air  
 
 1557 99.0 56 16 116/70  97 Room Air  
 
 
 Intake & Output
 
 
  1600  0800 05 0000
 
Intake Total   1200
 
Output Total   
 
Balance   1200
 
    
 
Intake, Oral   1200
 
 
 
 
Physical Exam
General Appearance: No Acute Distress
Other Physical Findings:
General Appearance- Alert, Oriented X3, Cooperative, No Acute Distress, Mild 
Distress
Skin No Rashes, No Breakdow
HEENT Atraumatic, PERRLA, EOMI
Neck Supple, No JVD, No thryomegaly
Lymphatic Axillary nl, Cervical nl
Cardiovascular Regular Rate, Normal S1, Normal S2, No Murmurs
Lungs Normal Air Movement
Abdomen Normal Bowel Sounds, Soft, No Hepatospenomegaly (epigastric tenderness)
Neurological Normal Speech, Strength at 5/5 X4 Ext, Normal Tone, Sensation 
Intact, Cranial Nerves 3-12 NL, Reflexes 2+
Extremities No Cyanosis, No Edema, Normal Pulses, No Tenderness/Swelling
Vascular Pulses Symmetrical
Current Medications:
 Current Medications
 
 
  Sig/Bridgett Start time  Last
 
Medication Dose Route Stop Time Status Admin
 
Acetaminophen 650 MG Q6P PRN  1745 AC 
 
  PO   
 
Ciprofloxacin 250 MG BID  1000 AC 
 
  PO  0959  2155
 
Clopidogrel Bisulfate 75 MG DAILY  1215 AC 
 
  PO   0857
 
Hydromorphone HCl 0.6 MG Q4P PRN  1900 AC 
 
  IV   2236
 
Metronidazole 500 MG Q8  0841 AC 
 
  PO   0610
 
Multivitamins  1 TAB DAILY  1000 AC 
 
Therapeutic  PO   0857
 
Omeprazole 40 MG DAILY AC  0700 AC 
 
  PO   0609
 
Ondansetron HCl 4 MG Q6P PRN  1745 AC 
 
  IV   0819
 
Oxycodone/ 1 TAB Q6P PRN  1900 AC 
 
Acetaminophen  PO   
 
Polyethylene Glycol 17 GM DAILY  1441 AC 
 
  PO   1642
 
Senna/Docusate Sodium 1 TAB BID  1442 AC 
 
  PO   2155
 
 
 
 
Last 24 Hrs of Lab/Devaughn Results
Last 24 Hrs of Labs/Mics:
 Laboratory Tests
 
17 0622:
Sodium Pending, Potassium Pending, Chloride Pending, Carbon Dioxide Pending, 
Anion Gap Pending, BUN Pending, Creatinine Pending, BUN/Creatinine Ratio Pending
, Magnesium Pending
 
 
Assessment/Plan
Assessment:
Ms Taveras is a 47-year-old woman who has a past medical history of hypertension, 
obesity (gastric bypass surgery done in 2017), admitted for near syncope, 
araceli cardia and hypoglycemia.
 
Assessment and Plan:
 
1. Nausea, vomiting, diarrhea: 
- Patient's nausea vomiting and diarrhea improved.  
- Is being treated withoral ciprofloxacin for diverticulitis
- Electrolyte abnormalities have been resolved
 
2. Late dumping syndrome:
- suspicious of hyper insulinemia.  Pending C-peptide level,. Insulin level 6.8
- Cosyntropic test within normal limits with repeat am cortisol was 10. ACTH 
stimulation test was done this morning and cortisol after 30 mins was 21 and 
cortisol after 60 mins was  29.9.
-  Will follow Endo
- If FSG is < 60, will draw blood sample stat to check plasma glucose, insulin, 
c-peptide and random cortisol.
- Diet as recommended by Endo:3 small meals and 3 snacks between meals; with 
complex carbohydrates 30 grams per meal, complex carbohydrates 15 grams per 
snack, encourage eating protein, avoid simple carbohydrates
- monitor FSGs before each meal, two hours after each meal, bedtime, midnight 
and 3 am.
 
3. Hypertension:
- Will controlled off medications.
 
4. Presyncope:
- No arrythmias, or heart blocks, has had bradycardia. 
- Positive trops, trended down, cardiology evaluated, likely demand ischemia. No
evidence of myocardial infarction
- Initially started on IV heparin, which was discontinued after speaking to Dr. Jean. Pt was already given aspirin, but plan to start on plavix in view of 
gastric bypass and increase incidence of ulcers. Discussed w/ Dr Frausto who is
agreeable to have the pt on plavix. 
 
5. DVT prophylaxis-pharmacological.
 
6. Diverticulitis- continue ciprofloxacin, and metronidazole. 
Problem List:
 1. Elevated troponin
 
 2. Diarrhea
 
Pain Ratin
Pain Location:
abdomen
Pain Goal: Pain 4 or less
Pain Plan:
tylenol
Tomorrow's Labs & Rationales:
no labs
Consulting Request:
   Consulting Specialty: Cardiology
   Consulting Physician:
Mal Liz
   Reason for Consult: Bradycardia

## 2017-05-08 NOTE — PN- ATT ADDEND
Attending MD Review Statement
 
Attending Statement
Attending MD Statement: examined this patient, discuss w/resident/PA/NP, agreed 
w/resident/PA/NP, reviewed EMR data (avail), discussed w/nursing, discussed w/
case mgmt
Attending Assessment/Plan:
 Laboratory Tests
 
 
 
05/08/17 0622:
Anion Gap 9, Estimated GFR > 60, BUN/Creatinine Ratio 28.3  H, Magnesium 1.9
Vital Signs
 
 
Date Time Temp Pulse Resp B/P B/P Pulse O2 O2 Flow FiO2
 
     Mean Ox Delivery Rate 
 
05/08 0803 98.8 57 18 140/82  99 Room Air  
 
05/07 2352 98.9 60 20 130/72  97 Room Air  
 
 
Patient seen and examined at bedside.  Discussed with patient care plan.  I went
over her meal plan with her prior to discharge.  The see the discharge summary 
for more details

## 2017-05-08 NOTE — PN- ENDOCRINOLOGY
Assessment/Plan
Assessment:
47 female with past medical history significant for morbid obesity s/p 
laparoscopic gastric bypass on 02/22/2017 with hiatal hernia repair, 
qapproximately 50 pounds weight loss post surgery, history of hypertension, GERD
, was admitted for abdominal pain, nausea, vomiting, diarrhea secondary to acute
diverticulitis.  Patient also had positive troponins, hypokalemia and 
hypomagnesemia. After she was started on Bariatric stage V diet, she had 
hypoglycemia with glucose level in the 50s after she ate Greek yogurt which has 
only 6 grams of sugar.
 
Dx: Hypoglycemia s/p gastric bypass approximately 2 months ago-- etiology 
unclear at this point.
 
recommended:
1. monitor FSGs before each meal, two hours after each meal, bedtime, midnight 
and 3 am.
2. recommend 3 small meals and 3 snacks between meals;
 ---complex carbohydrates 30 grams per meal
 ---complex carbohydrates 15 grams per snack
 ---encourage eating protein
 ---avoid simple carbohydrates
3. if FSG is < 60, please draw blood sample stat to check plasma glucose, 
insulin, c-peptide and random cortisol.
 
Repeat am cortisol was 10. ACTH stimulation test was done this morning and 
cortisol after 30 mins was 21 and cortisol after 60 mins was  29.9 which are in 
the normal range.
 
Her FSGs were 99, 90, 112, 79, 88, 79, 82 and 77 which has been stable.
 
Plan:
continue the current diet.
 
Subjective
Subjective:
She feels well this morning.
 
Objective
Last 24 Hrs of Vital Signs/I&O
 Vital Signs
 
 
Date Time Temp Pulse Resp B/P B/P Pulse O2 O2 Flow FiO2
 
     Mean Ox Delivery Rate 
 
05/08 0803 98.8 57 18 140/82  99 Room Air  
 
05/07 2352 98.9 60 20 130/72  97 Room Air  
 
05/07 1557 99.0 56 16 116/70  97 Room Air  
 
 
 Intake & Output
 
 
 05/08 1600 05/08 0800 05/08 0000
 
Intake Total   1200
 
Output Total   
 
Balance   1200
 
    
 
Intake, Oral   1200
 
 
 
 
Results
Pertinent Lab/Devaughn Results:
 Laboratory Tests
 
 
 05/08
 
 0622
 
Chemistry 
 
  Sodium (137 - 145 mmol/L) 138
 
  Potassium (3.5 - 5.1 mmol/L) 3.8
 
  Chloride (98 - 107 mmol/L) 105
 
  Carbon Dioxide (22 - 30 mmol/L) 24
 
  Anion Gap (5 - 16) 9
 
  BUN (7 - 17 mg/dL) 17
 
  Creatinine (0.5 - 1.0 mg/dL) 0.6
 
  Estimated GFR (>60 ml/min) > 60
 
  BUN/Creatinine Ratio (7 - 25 %) 28.3  H
 
  Magnesium (1.6 - 2.3 mg/dL) 1.9

## 2017-05-08 NOTE — PATIENT DISCHARGE INSTRUCTIONS
Discharge Instructions
 
General Discharge Information
You were seen/treated for:
- Hypoglycemia
- Near syncope
- Elevated cardiac enzymes
Watch for these problems:
- chest pain, shortness of breath
- dizziness, lightheadedness, sweating
- abdominal pain, diarrhea.
Special Instructions:
1. Please see your PCP within one week of discharge. 
 
2. Please see your endocrinologist within one week of discharge. 
 
3. Please see your cardiologist within one week of discharge. 
 
4. Please follow the meal plan-
 
2. recommend 3 small meals and 3 snacks between meals;
--complex carbohydrates 30 grams per meal
--complex carbohydrates 15 grams per snack
--encourage eating protein
--avoid simple carbohydrates
 
Acute Coronary Syndrome
 
Inclusion Criteria
At DC or during hospital stay patient has or had the following:
ACS DIAGNOSIS Yes
 
Discharge Core Measures
Meds if any: Prescribed or Continued at Discharge
ACE/ARB if EF <40% No
Aspirin No
Beta-Blocker Yes
Statin No
Meds if any: NOT Prescribed or Continued at Discharge
No Aspirin d/t gastric bypass
 
Congestive Heart Failure
 
Inclusion Criteria
At DC or during hospital stay patient has or had the following:
CHF DIAGNOSIS No
 
Discharge Core Measures
Meds if any: Prescribed or Continued at Discharge
Meds if any: NOT Prescribed or Continued at Discharge
 
Cerebrovascular accident
 
Inclusion Criteria
At DC or during hospital stay patient has or had the following:
CVA/TIA Diagnosis No
 
Discharge Core Measures
Meds if any: Prescribed or Continued at Discharge
Meds if any: NOT Prescribed or Continued at Discharge
 
Venous thromboembolism
 
Inclusion Criteria
VTE Diagnosis No
VTE Type NONE
VTE Confirmed by (Test) NONE
 
Discharge Core Measures
- Per Current guidelines, there needs to be overlap
- treatment for the first 5 days of Warfarin therapy.
- If discharged on Warfarin prior to 5 days of
- overlap therapy, the patient will need to be
- assessed for post discharge needs including
- *Post discharge parental anticoagulation
- *Warfarin and/or parental anticoagulation education
- *Follow up date to check INR post discharge
At least 5 days overlap therapy as Inpatient No
Meds if any: Prescribed or Continued at Discharge
Note: Overlap Therapy is Warfarin and Anticoagulant
Meds if any: NOT Prescribed or Continued at Discharge

## 2017-05-08 NOTE — PN- CARDIOLOGY
Subjective
Subjective:
Feeling well.  No chest pain.  No palpitations.  No lightheadedness or 
dizziness.  No nausea or vomiting.
 
Objective
Vital Signs and I&Os
Vital Signs
 
 
Date Time Temp Pulse Resp B/P B/P Pulse O2 O2 Flow FiO2
 
     Mean Ox Delivery Rate 
 
05/08 0803 98.8 57 18 140/82  99 Room Air  
 
05/07 2352 98.9 60 20 130/72  97 Room Air  
 
05/07 1557 99.0 56 16 116/70  97 Room Air  
 
 
 Intake & Output
 
 
 05/08 1600 05/08 0800 05/08 0000 05/07 1600 05/07 0800 05/07 0000
 
Intake Total   1200 480 400 600
 
Output Total      
 
Balance   1200 480 400 600
 
       
 
Intake, Oral   1200 480 400 600
 
Number     1 
 
Bowel      
 
Movements      
 
 
 
Physical Exam:
Gen: NAD
HEENT: normal
Lungs: clear to auscultation, normal resp. effort
Heart: RRR, S1, S2, no murmurs
Abdomen: Soft, nontender, no masses
Extremities: 1+ edema
Neuro: Alert and oriented x 3, cranial nerves intact
Current Medications:
 Current Medications
 
 
  Sig/Bridgett Start time  Last
 
Medication Dose Route Stop Time Status Admin
 
Acetaminophen 650 MG Q6P PRN 05/02 1745 AC 
 
  PO   
 
Ciprofloxacin 250 MG BID 05/05 1000 AC 05/08
 
  PO 05/09 0959  1013
 
Clopidogrel Bisulfate 75 MG DAILY 05/03 1215 AC 05/08
 
  PO   1013
 
Hydromorphone HCl 0.6 MG Q4P PRN 05/02 1900 AC 05/07
 
  IV   2236
 
Metronidazole 500 MG Q8 05/05 0841 AC 05/08
 
  PO   0610
 
Multivitamins  1 TAB DAILY 05/03 1000 AC 05/08
 
Therapeutic  PO   1013
 
Omeprazole 40 MG DAILY AC 05/04 0700 AC 05/08
 
  PO   0609
 
Ondansetron HCl 4 MG Q6P PRN 05/02 1745 AC 05/06
 
  IV   0819
 
Oxycodone/ 1 TAB Q6P PRN 05/02 1900 AC 05/08
 
Acetaminophen  PO   1013
 
Polyethylene Glycol 17 GM DAILY 05/06 1441 AC 05/06
 
  PO   1642
 
Senna/Docusate Sodium 1 TAB BID 05/06 1442 AC 05/07
 
  PO   2155
 
 
 
 
Results
Last 48 Hrs of Labs/Mics:
 Laboratory Tests
 
05/08/17 0622:
Anion Gap 9, Estimated GFR > 60, BUN/Creatinine Ratio 28.3  H, Magnesium 1.9
 
05/07/17 0710:
Cortisol AM Sample 29.9  H
 
05/07/17 0630:
Cortisol AM Sample 21.0
 
05/07/17 0555:
Cortisol AM Sample 10.0
 
05/07/17 0555:
Anion Gap 11, Estimated GFR > 60, BUN/Creatinine Ratio 32.0  H
 
 
Assessment/Plan
Assessment/Plan
1.  Status post gastric bypass surgery
2.  Mild troponin elevation, likely demand ischemia
3.  Hypertension
4.  Premature atrial contractions
5.  Hypokalemia and hypomagnesemia
 
Plan:
* Continue current cardiac medications.
* Okay for discharge from cardiac standpoint.
* Follow up with Dr. Jean in one to 2 weeks.
* Call Dr. Jean with any cardiac symptoms after discharge.
Continue telemetry? No

## 2018-03-13 ENCOUNTER — HOSPITAL ENCOUNTER (INPATIENT)
Dept: HOSPITAL 68 - ERH | Age: 48
LOS: 1 days | DRG: 230 | End: 2018-03-14
Attending: SURGERY | Admitting: SURGERY
Payer: COMMERCIAL

## 2018-03-13 VITALS — DIASTOLIC BLOOD PRESSURE: 71 MMHG | SYSTOLIC BLOOD PRESSURE: 135 MMHG

## 2018-03-13 VITALS — DIASTOLIC BLOOD PRESSURE: 53 MMHG | SYSTOLIC BLOOD PRESSURE: 124 MMHG

## 2018-03-13 VITALS — HEIGHT: 67 IN | WEIGHT: 178.25 LBS | BODY MASS INDEX: 27.98 KG/M2

## 2018-03-13 VITALS — DIASTOLIC BLOOD PRESSURE: 74 MMHG | SYSTOLIC BLOOD PRESSURE: 162 MMHG

## 2018-03-13 VITALS — DIASTOLIC BLOOD PRESSURE: 76 MMHG | SYSTOLIC BLOOD PRESSURE: 133 MMHG

## 2018-03-13 DIAGNOSIS — M19.90: ICD-10-CM

## 2018-03-13 DIAGNOSIS — Z90.49: ICD-10-CM

## 2018-03-13 DIAGNOSIS — I10: ICD-10-CM

## 2018-03-13 DIAGNOSIS — Z98.890: ICD-10-CM

## 2018-03-13 DIAGNOSIS — E55.9: ICD-10-CM

## 2018-03-13 DIAGNOSIS — K45.8: Primary | ICD-10-CM

## 2018-03-13 DIAGNOSIS — Z98.84: ICD-10-CM

## 2018-03-13 LAB
ABSOLUTE GRANULOCYTE CT: 4.6 /CUMM (ref 1.4–6.5)
APTT BLD: 33 SEC (ref 25–37)
BASOPHILS # BLD: 0.1 /CUMM (ref 0–0.2)
BASOPHILS NFR BLD: 1 % (ref 0–2)
EOSINOPHIL # BLD: 0.1 /CUMM (ref 0–0.7)
EOSINOPHIL NFR BLD: 1 % (ref 0–5)
ERYTHROCYTE [DISTWIDTH] IN BLOOD BY AUTOMATED COUNT: 14.1 % (ref 11.5–14.5)
GRANULOCYTES NFR BLD: 63.4 % (ref 42.2–75.2)
HCT VFR BLD CALC: 42.9 % (ref 37–47)
LYMPHOCYTES # BLD: 2.2 /CUMM (ref 1.2–3.4)
MCH RBC QN AUTO: 31.4 PG (ref 27–31)
MCHC RBC AUTO-ENTMCNC: 33.9 G/DL (ref 33–37)
MCV RBC AUTO: 92.4 FL (ref 81–99)
MONOCYTES # BLD: 0.3 /CUMM (ref 0.1–0.6)
PLATELET # BLD: 198 /CUMM (ref 130–400)
PMV BLD AUTO: 9.4 FL (ref 7.4–10.4)
PROTHROMBIN TIME: 9.9 SEC (ref 9.4–12.5)
RED BLOOD CELL CT: 4.64 /CUMM (ref 4.2–5.4)
WBC # BLD AUTO: 7.3 /CUMM (ref 4.8–10.8)

## 2018-03-13 PROCEDURE — 2NBSP: CPT

## 2018-03-13 PROCEDURE — 0DQA4ZZ REPAIR JEJUNUM, PERCUTANEOUS ENDOSCOPIC APPROACH: ICD-10-PCS | Performed by: SURGERY

## 2018-03-13 NOTE — CT SCAN REPORT
EXAMINATION:
CT ABDOMEN AND PELVIS WITH CONTRAST
 
CLINICAL INFORMATION:
Sudden onset periumbilical pain
 
COMPARISON:
None
 
TECHNIQUE:
Multidetector volumetric imaging was performed of the abdomen and pelvis
following IV administration of 95 mL of Optiray 320 intravenous contrast.
Sagittal and coronal reformatted images were obtained on the technologist's
workstation.
 
DLP:
384.54 mGy-cm
 
FINDINGS:
 
LUNG BASES: There is mild dependent atelectasis at the lung bases.
 
LIVER, GALLBLADDER, AND BILIARY TREE: The liver is normal in size, shape, and
attenuation. There is a redemonstrated hypoattenuating lesion in the left
hepatic lobe measuring approximately 2 cm, similar to prior. No biliary
ductal dilatation is present. Patient is status post cholecystectomy.
 
PANCREAS: Unremarkable.
 
SPLEEN: Unremarkable.
 
ADRENAL GLANDS: Unremarkable.
 
KIDNEYS AND URETERS: No hydronephrosis bilaterally. The left renal collecting
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 There are small hypoattenuating lesions in bilateral
kidneys favoring cysts. There is a right lower pole renal calculus measuring
0.7 cm in length.
 
BLADDER: Unremarkable.
 
GASTROINTESTINAL TRACT: Patient is status post gastric bypass surgery. No
specific evidence for bowel obstruction. The cecum has a different
orientation compared to 5/2/2017 with the base of the cecum now directed
towards the midline, suggesting a mobile cecum. There is prominent distention
of the cecum and ascending colon with gradual decrease in colonic caliber
extending to the splenic flecture; the remainder of the colon is mostly
collapsed. There is colonic diverticulosis without convincing evidence for
diverticulitis. The appendix is not well delineated. There is diffuse fat
stranding throughout the mesentery in keeping with edema. A small volume of
ascites is noted in the right abdomen.
 
ABDOMINAL WALL: No significant hernia is appreciated.
 
LYMPH NODES: Normal.
 
VASCULAR: There is mild calcification along the infrarenal aorta.
 
PELVIC VISCERA: Unremarkable. There is a moderate amount of pelvic free fluid.
 
OSSEOUS STRUCTURES: Mild scattered degenerative changes are present in the
spine.
 
IMPRESSION:
1.  Configuration of the cecum has changed since 5/2/2017, with the base of
the cecum now directed towards the midline suggesting a mobile cecum. No
convincing evidence for obstruction. There is prominent gaseous distention of
the cecum and ascending colon with gradual decrease in caliber of the
remainder of the colon.
2.  Status post gastric bypass surgery.
3.  Diffuse mesenteric edema and small volume ascites.
4.  Colonic diverticulosis.

## 2018-03-13 NOTE — ADMISSION CORE MEASURES
Acute Coronary Syndrome (CM)
 
ACS Core Measures
Acute Coronary Syndrome Diagnosis No
 
Congestive Heart Failure (NEW)
 
CHF Core Measures
Congestive Heart Failure Diagnosis No
 
Cerebrovascular Accident (NEW)
 
CVA Core Measures
CVA/TIA Diagnosis No
 
Venous Thromboembolism AUG17
 
VTE Core Beau (View Protocol)
VTE Risk Factors Age>40
No Mechanical VTE Prophylaxis d/t N/A MechProphylax Ordered
No VTE Pharm Prophylaxis d/t NA PharmProphylax ordered
 
Problem List
 
As ranked by this Provider
includes Assessment & Plan
 1. Abdominal pain
 
 2. Gastric bypass status for obesity
 
 
HOME MEDS
Home Med List
No Known Home Medications

## 2018-03-13 NOTE — HISTORY & PHYSICAL PRE-OP
Ana eLzama 18 0559:
General Information and HPI
MD Statement:
I have seen and personally examined ROMINA SLAUGHTER and documented this H&P.
 
The patient is a 47 year old F who presented with a patient stated chief 
complaint of [].abdominal pain
 
Source of Information: patient
Exam Limitations: no limitations
History of Present Illness:
48yo female with a  history of a lap gastric bypass on 2017 and a 
cholecystectomy one month prior to that presents with abdominal pain.  She 
states that over the past couple of weeks she has had a similar pain which is 
localized to her mid abdomen and presents suddenly not associated with eating, 
it resolves spontaneously.  SHe has been able to pass gas and has had normal 
bowel movemets including 3/12.
On the evening of 3/12 she had a similar pain but it was much worse than prior 
and associated with nausea.  She vomited a couple of times before coming to the 
ED.  She is currently not nauseated and her pain is barely tolerable with 
dilaudid IV - morphine did not help.  The pain is mid abdomen and radiates to 
her back.
Her last foor was at 7 pm on 3/12.
Other than this pain she has been feeling well since her surgery and has lost 
over 100 pounds.
 
Allergies/Medications
Allergies:
Coded Allergies:
No Known Allergies (10/25/16)
 
Home Med list
No Known Home Medications
 
 
Past History
 
Medical History
Neurological: NONE
EENT: NONE
Cardiovascular: hypertension
Respiratory: ? mild CUATE
Gastrointestinal: POST OP N/V; GJ bypass
Hepatic: hemangioma left lobe liver
Renal: nephrolithiasis (on right w/o hydro; ? type)
Musculoskeletal: degen joint disease
Psychiatric: NONE
Endocrine: vitamin D deficiency
Blood Disorders: NONE
Cancer(s): NONE
GYN/Reproductive: NONE
History of MRSA: No
History of VRE: No
History of CDIFF: No
 
Surgical History
Pertinent Surgical History: cholecystectomy (10/26/16: lap CCKY), 17: LAP 
GJ BYPASS & LAP HH REPAIR
 
Past Family/Social History
 
Family History
Relations & Conditions if any
FATHER (unknown cause, + EtOH). , Age 60+.
MOTHER (EtOH). Age 60+.
Relation not specified for:
  *No pertinent family history
 
 
Psychosocial History
Who Do You Live With? child
Services at Home None
Primary Language: English
ETOH Use: denies use
Illicit Drug Use: denies illicit drug use
Living Will? no
Power of /HCP? no
 
Functional Ability
ADLs
Independent: dressing, eating, toileting, bathing. 
Ambulation: independent
IADLs
Independent: shopping, housework, finances, food prep, telephone, transportation
, medication admin. 
 
Review of Systems
 
Review of Systems
GI:
Reports: see HPI, abdominal pain. 
 
Exam & Diagnostic Data
Last 24 Hrs of Vital Signs/I&O
 Vital Signs
 
 
Date Time Temp Pulse Resp B/P B/P Pulse O2 O2 Flow FiO2
 
     Mean Ox Delivery Rate 
 
 05 98.4 65 20 140/83  100 Room Air  
 
 0144      97 Room Air  
 
 0140  62 18 163/85  100 Room Air  
 
 
 Intake & Output
 
 
  0800  0000  1600
 
Intake Total   
 
Output Total   
 
Balance   
 
    
 
Patient 175 lb  
 
Weight   
 
Weight Reported by Patient  
 
Measurement   
 
Method   
 
 
 
Physical Exam:
General: alert and oriented times three
Chest: clear anteriorly bilaterally, RRR
Abd: soft, nondistended, tender to palpation - severe at umbilicus, moderately 
tender to palpation elsewhere with radiation to the midline, hypoactive bowel 
sounds
Ext: warm, no edema
Last 24 Hrs of Labs/Devaughn:
 Laboratory Tests
 
18 0518:
Urine Color YEL, Urine Clarity CLEAR, Urine pH 6.0, Ur Specific Gravity 1.015, 
Urine Protein NEG, Urine Ketones TRACE  H, Urine Nitrite NEG, Urine Bilirubin 
NEG, Urine Urobilinogen 0.2, Ur Leukocyte Esterase NEG, Ur Microscopic EXAM NOT 
REQUIRED, Urine Hemoglobin NEG, Urine Glucose 100  H
 
18 0235:
Anion Gap 16, Estimated GFR > 60, BUN/Creatinine Ratio 28.0  H, Glucose 108  H, 
Lactic Acid 3.2  H, Calcium 8.5, Total Bilirubin 0.1  L, AST 16, ALT 16, 
Alkaline Phosphatase 59, Total Protein 6.5, Albumin 3.7, Globulin 2.8, Albumin/
Globulin Ratio 1.3, Amylase 48, Lipase 116, Total Beta HCG NEGATIVE
 
18 0151:
CBC w Diff MAN DIFF ORDERED, RBC 4.64, MCV 92.4, MCH 31.4  H, MCHC 33.9, RDW 
14.1, MPV 9.4, Gran % 63.4, Lymphocytes % 30.1, Monocytes % 4.5, Eosinophils % 
1.0, Basophils % 1.0, Absolute Granulocytes 4.6, Segmented Neutrophils 54, Band 
Neutrophils 4, Absolute Lymphocytes 2.2, Lymphocytes 29, Monocytes 5, Absolute 
Monocytes 0.3, Eosinophils 5, Absolute Eosinophils 0.1, Basophils 3  H, Absolute
Basophils 0.1, Platelet Estimate ADEQUATE, Normocytic RBCs VERIFIED, 
Normochromic RBCs VERIFIED
 
 
Diagnostic Data
Other Results
CT Abd/pelvis:
Patient is status post gastric bypass surgery. No
specific evidence for bowel obstruction. The cecum has a different
orientation compared to 2017 with the base of the cecum now directed
towards the midline, suggesting a mobile cecum. There is prominent distention
of the cecum and ascending colon with gradual decrease in colonic caliber
extending to the splenic flecture; the remainder of the colon is mostly
collapsed. There is colonic diverticulosis without convincing evidence for
diverticulitis. The appendix is not well delineated. There is diffuse fat
stranding throughout the mesentery in keeping with edema. A small volume of
ascites is noted in the right abdomen.
 
IMPRESSION:
1.  Configuration of the cecum has changed since 2017, with the base of
the cecum now directed towards the midline suggesting a mobile cecum. No
convincing evidence for obstruction. There is prominent gaseous distention of
the cecum and ascending colon with gradual decrease in caliber of the
remainder of the colon.
2.  Status post gastric bypass surgery.
3.  Diffuse mesenteric edema and small volume ascites.
4.  Colonic diverticulosis.
 
Assessment/Plan
Assessment/Plan:
48yo female with a history of lgp one year ago now with severe abdominal pain 
and an inconclusive CT 
 
Discussed with dr Sung
He feels it is likely an internal hernia
Wants to go to OR as soon as available
On call to OR
Discussed with patient
 
As Ranked By This Provider
Problem List:
 1. Abdominal pain
 
 
 
Ana Lilia ADKINSMaulik 18 1009:
Attending MD Review Statement
 
Attending Statement
Attending MD Statement: examined this patient, discuss w/resident/PA/NP, agreed 
w/resident/PA/NP, reviewed EMR data (avail), reviewed images
Attending Assessment/Plan:
Patient seen and examined, agree with above. Acute onset abdominal pain 
radiating to the back, +N/V. AVSS. Abd-soft. Labs ok, LA 3.2. CT scan - 
Mesenteric edema, +ascites, distended cecum that is more centrally positioned. 
Patient has gotten multiple doses of narcotics without effect, given persistent 
severe pain and CT showing above findings, I believe patient needs an urgent Dx 
laparoscopy to evaluate for an  internal hernia given h/o LRYGB. D/W patient and
ED staff.

## 2018-03-13 NOTE — OPERATIVE REPORT
Operative/Inv Procedure Report
Surgery Date: 03/13/18
Name of Procedure:
Laparoscopic reduction with subsequent repair of an internal hernia
Pre-Operative Diagnosis:
Abdominal pain, internal hernia
Post-Operative Diagnosis:
Same
Estimated Blood Loss: less than 50ml
Surgeon/Assistant:
Maulik Fernandez
Anesthesia: general endotracheal tube
IV Fluids:
700 cc
Drains:
None
Specimens:
None
Complications:
None
Condition:
Stable
Operative Indication:
This is a 39-year-old female that presented to the emergency room with acute 
onset of abdominal pain, the patient is status post lap scopic Jacky-en-Y gastric
bypass 1 year ago.  Patient's pain was not improved with multiple doses of pain 
medication.  CT scan of abdomen and pelvis showed more was centrally located 
cecum with a lot of mesenteric edema and ascites.  Given the above and her 
history of a gastric bypass, I believe emergent diagnostic laparoscopy was in 
order.  All risks including but not limited to bleeding, infection, and injury 
to surrounding bowel were discussed in detail.  The patient understood 
everything and decided to proceed.
 
Operative/Procedure Note
Note:
The patient was brought to the operating room and placed on the table in supine 
position.  Venodyne stockings were placed and adequate general endotracheal 
anesthesia was obtained.  The patient was prepped and draped in standard 
surgical fashion.  Began the procedure with a 2 cm transverse incision 
supraumbilically and slightly to left of the midline over the patient's prior 
port site.  Then using a 12 mm clear Visiport and a 10 mm 0 laparoscope the 
abdominal cavity was accessed.  Great care was taken to go through anterior 
rectus sheath, the posterior rectus sheath, and through the peritoneum.  Once 
the peritoneum was entered the abdominal cavity was insufflated to 15 mmHg.  
Upon initial examination we noted a distended cecum and transverse colon, 
chylous ascites, and some hyperemia/dilatation of the small bowel.  5 mm ports 
are placed, one in left lower quadrant and one suprapubic.  Began the procedure 
by running the small bowel from the terminal ileum proximally.  Running small 
bowel towards the distal anastomosis did appear that the small bowel was 
unraveling with some areas that required some manipulation to fully reduce the 
small bowel.  The jejunojejunostomy was reached and did appear that it had 
reduced/untwisted.  At that point more 5 mm reports are placed, one in the right
upper quadrant one in the right lateral position.  Then we began running the 
small bowel proximally from the jejunojejunostomy.  The Jacky limb was identified
and run up to the gastrojejunostomy and did not appear twisted.  The 
biliopancreatic limb was once again identified also and run to the ligament of 
Treitz.  Upon examination of the jejunojejunostomy was noted that the defect was
opened and there was hyperemia around the defect suggesting possible site of the
hernia.  At that point the jejunojejunostomy defect was closed using 2-0 Tycron 
suture in a running fashion.  We then brought our attention to Kern's defect
and the defect was closed using 2-0 silk suture.  We examined both defects and 
appeared to be adequately closed.  At that point the small bowel was run once 
again from the ligament of Treitz to the jejunojejunostomy and then proximally 
along each limb, no further twist or internal hernia was noted and the bowel did
appear to be in appropriate position.  The cecum and the transverse colon still 
appeared dilated however did appear that now the gas was moving further distally
into the splenic flexure.  At that point with no further pathology noted the 
ports were removed under direct visualization, no bleeding was noted.  The skin 
was closed using 4-0 Monocryl.  Steri-Strips and dressings were placed.  The 
patient was successfully extubated and transferred to recovery room in stable 
condition.  The patient tolerated procedure well with no complications.
Findings:
Internal hernia likely through the JJ defect, chylous ascites, distended cecum/
ascending colon
CC:
Iraj LOPEZ,Chad CALVO

## 2018-03-13 NOTE — RADIOLOGY REPORT
EXAMINATION:
XR PORTABLE CHEST
 
CLINICAL INFORMATION:
Preoperative
 
COMPARISON:
5/2/2017
 
TECHNIQUE:
Portable frontal view of the chest was obtained.
 
FINDINGS:
There is mild elevation of the left hemidiaphragm. No focal consolidation is
seen bilaterally. No evidence of pneumothorax, pleural effusion, or pulmonary
edema.
 
The cardiac silhouette appears near the upper limits of normal in size. No
acute osseous findings are seen.
 
IMPRESSION:
No acute cardiopulmonary findings.

## 2018-03-13 NOTE — PN- BARIATRICS
Subjective
Subjective:
Patient seen postoperatively.  She is feeling well.  Her pain is much better 
controlled.  She has no nausea vomiting.  No flatus yet.  She has been 
ambulatory to the bathroom but has not left the room.  She is much more 
comfortable than preoperatively.
 
Objective
Vital Signs and I&Os
Vital Signs
 
 
Date Time Temp Pulse Resp B/P B/P Pulse O2 O2 Flow FiO2
 
     Mean Ox Delivery Rate 
 
03/13 1426 98.7 81 18 162/74  97 Room Air Room Air 
 
03/13 0529 98.4 65 20 140/83  100 Room Air  
 
03/13 0144      97 Room Air  
 
03/13 0140  62 18 163/85  100 Room Air  
 
 
 Intake & Output
 
 
 03/13 1600 03/13 0800 03/13 0000 03/12 1600 03/12 0800 03/12 0000
 
Intake Total      
 
Output Total      
 
Balance      
 
       
 
Patient 175 lb 175 lb    
 
Weight      
 
Weight Reported by Patient Reported by Patient    
 
Measurement      
 
Method      
 
 
 
Physical Exam:
Well-developed well-nourished no apparent distress.
HEENT: Atraumatic, extraocular motion intact
Neck: Supple, no lymphadenopathy
Heart: Regular rate and rhythm no murmur
Respiratory: No respiratory distress clear to auscultation bilateral
Abdomen: Soft, tenderness throughout the mid abdominal region is mild to 
moderate.  Mild distention.  Positive bowel sounds.  Dressings clean dry and 
intact
No tympany, no peritoneal signs
Extremities: No edema, no calf pain 
Neuro: Alert and oriented x3
Psych: Mood affect normal, normal memory normal judgment.
Skin: Warm and dry, no rash on exposed skin
 
Assessment/Plan
Assessment/Plan
Postop day #0 status post Laparoscopic reduction with subsequent repair of an 
internal hernia
 
Surgically stable, improving.
Advanced to clear liquid diet for dinner tonight
Encourage ambulation
Reglan as needed for nausea and for promotion of gut Motility
Continue IV fluids
Following a.m. labs
Heparin subcutaneous for DVT prophylaxis
Protonix IV for GI prophylaxis
Serial abdominal exams. 
 
 
Core Measures
 
Venous Thromboembolism
VTE Risk Factors Age>40
No Mechanical VTE Prophylaxis d/t N/A MechProphylax Ordered
No VTE Pharm Prophylaxis d/t NA PharmProphylax ordered

## 2018-03-13 NOTE — ED GI/GU/ABDOMINAL COMPLAINT
History of Present Illness
 
General
Chief Complaint: Abdominal Pain/Flank Pain
Stated Complaint: ABD PAIN
Source: patient, old records, EMS
Exam Limitations: no limitations
 
Vital Signs & Intake/Output
Vital Signs & Intake/Output
 Vital Signs
 
 
Date Time Temp Pulse Resp B/P B/P Pulse O2 O2 Flow FiO2
 
     Mean Ox Delivery Rate 
 
144      97 Room Air  
 
140  62 18 163/85  100 Room Air  
 
 
 
Allergies
Coded Allergies:
No Known Allergies (10/25/16)
 
Reconcile Medications
No Known Home Medications
 
Triage Note:
PT FROM HOME C/O UMBILICAL/ABD PAIN X 1.5 HRS. PT
 STATES THAT SHE WAS AT HOME LAYING IN BED AND A
 SHARP SHOOTING PAIN FROM BEHIND PTS UMBILICUS
 RADIATING TO UPPER ABD. PT STATES N/V X2 EPISODES.
 PT ARRIVES WITH A PREHOSPITAL RAC #20. PT PLACED
 ON MONITOR. PT CHANGED INTO GOWN. PT STATES SHE
 HAD A BIOPSY LAST WEEK TO SEE IF SHE HAS CERVICAL
 CANCER.
Triage Nurses Notes Reviewed? yes
Pregnant? N
Is pt currently breastfeeding? No
HPI:
Symptoms will sleep with a sharp stabbing pain at her umbilicus.  The pain 
radiates up into her upper abdomen.  The pain is constant.  There are no 
aggravating or medication package.  Positive nausea vomiting.  No diarrhea.  No 
fevers or chills.  During the physical portion of the exam patient has syncopal 
episode while palpating her abdomen.  Patient was on the heart monitor at the 
time and she remained sinus rhythm in the 60s.  Patient quickly came to.
 
Past History
 
Travel History
Traveled to [place] past 21 day No
 
Medical History
Any Pertinent Medical History? see below for history
Neurological: NONE
EENT: NONE
Cardiovascular: hypertension
Respiratory: ? mild CUATE
Gastrointestinal: POST OP N/V; GJ bypass
Hepatic: hemangioma left lobe liver
Renal: nephrolithiasis (on right w/o hydro; ? type)
Musculoskeletal: degen joint disease
Psychiatric: NONE
Endocrine: vitamin D deficiency
Blood Disorders: NONE
Cancer(s): NONE
GYN/Reproductive: NONE
History of MRSA: No
History of VRE: No
History of CDIFF: No
 
Surgical History
Surgical History: cholecystectomy (10/26/16: lap CCKY), 17: LAP  GJ BYPASS
& LAP HH REPAIR
 
Psychosocial History
Who do you live with Daughter
Services at Home None
What is your primary language English
Tobacco Use: Never used
ETOH Use: denies use
Illicit Drug Use: denies illicit drug use
 
Family History
Family History, If Any:
FATHER (unknown cause, + EtOH). , Age 60+.
MOTHER (EtOH). Age 60+.
Relation not specified for:
  *No pertinent family history
 
Hx Contributory? No
 
Review of Systems
 
Review of Systems
Constitutional:
Reports: no symptoms. 
EENTM:
Reports: no symptoms. 
Respiratory:
Reports: no symptoms. 
Cardiovascular:
Reports: no symptoms. 
GI:
Reports: see HPI, abdominal pain, nausea, vomiting. 
Genitourinary:
Reports: no symptoms. 
Musculoskeletal:
Reports: no symptoms. 
Skin:
Reports: no symptoms. 
Neurological/Psychological:
Reports: no symptoms. 
Hematologic/Endocrine:
Reports: no symptoms. 
Immunologic/Allergic:
Reports: no symptoms. 
All Other Systems: Reviewed and Negative
 
Physical Exam
 
Physical Exam
General Appearance: well developed/nourished, alert, awake, anxious, moderate 
distress
Head: atraumatic, normal appearance
Eyes:
Bilateral: PERRL, EOMI. 
Ears, Nose, Throat, Mouth: hearing grossly normal, DRY MUCOSA
Neck: normal inspection, supple, full range of motion
Respiratory: normal breath sounds, chest non-tender, no respiratory distress, 
lungs clear
Cardiovascular: regular rate/rhythm, normal peripheral pulses
Gastrointestinal: normal bowel sounds, soft, no organomegaly, guarding, 
tenderness
Back: normal inspection
Extremities: normal range of motion
Neurologic/Psych: no motor/sensory deficits, awake, alert, oriented x 3, normal 
mood/affect
Skin: intact, normal color, warm/dry
 
Core Measures
ACS in differential dx? No
Sepsis Present: No
Sepsis Focused Exam Completed? No
 
Progress
Differential Diagnosis: appendicitis, bowel obstruction, diverticulitis, 
gastritis, hepatitis, hernia, ischemic bowel, inflamm bowel dis, pancreatitis, 
peptic ulcer, PUD/GERD, perforated viscous, SBO
Plan of Care:
 Orders
 
 
Procedure Date/time Status
 
Nothing by Mouth  B Active
 
ED Holding Orders 6 Active
 
Admit to inpatient  0456 Active
 
Vital Signs  0456 Active
 
Code Status  0456 Active
 
EKG  0201 Active
 
Add-on Test (ER Only)  0151 Active
 
LACTIC ACID  0151 Complete
 
URINALYSIS 149 Active
 
LIPASE  014 Complete
 
HUMAN BETA HCG SCREEN 149 Complete
 
COMPREHENSIVE METABOLIC PANEL 149 Complete
 
CBC WITHOUT DIFFERENTIAL 149 Complete
 
AMYLASE 03/13 0149 Complete
 
 
 Laboratory Tests
 
 
 
18 0518:
Urine Color Pending, Urine Clarity Pending, Urine pH Pending, Ur Specific 
Gravity Pending, Urine Protein Pending, Urine Ketones Pending, Urine Nitrite 
Pending, Urine Bilirubin Pending, Urine Urobilinogen Pending, Ur Leukocyte 
Esterase Pending, Ur Microscopic Pending, Urine Hemoglobin Pending, Urine 
Glucose Pending
 
18 0235:
Anion Gap 16, Estimated GFR > 60, BUN/Creatinine Ratio 28.0  H, Glucose 108  H, 
Lactic Acid 3.2  H, Calcium 8.5, Total Bilirubin 0.1  L, AST 16, ALT 16, 
Alkaline Phosphatase 59, Total Protein 6.5, Albumin 3.7, Globulin 2.8, Albumin/
Globulin Ratio 1.3, Amylase 48, Lipase 116, Total Beta HCG NEGATIVE
 
18 0151:
CBC w Diff MAN DIFF ORDERED, RBC 4.64, MCV 92.4, MCH 31.4  H, MCHC 33.9, RDW 
14.1, MPV 9.4, Gran % 63.4, Lymphocytes % 30.1, Monocytes % 4.5, Eosinophils % 
1.0, Basophils % 1.0, Absolute Granulocytes 4.6, Segmented Neutrophils 54, Band 
Neutrophils 4, Absolute Lymphocytes 2.2, Lymphocytes 29, Monocytes 5, Absolute 
Monocytes 0.3, Eosinophils 5, Absolute Eosinophils 0.1, Basophils 3  H, Absolute
Basophils 0.1, Platelet Estimate ADEQUATE, Normocytic RBCs VERIFIED, 
Normochromic RBCs VERIFIED
 
Diagnostic Imaging:
Viewed by Me: CT Scan.  Discussed w/RAD: CT Scan. 
Radiology Impression: PATIENT: ROMINA SLAUGHTER  MEDICAL RECORD NO: [id number] PRESENT
AGE: 47  PATIENT ACCOUNT NO: [id number] : 68  LOCATION: [facility] ORDERING 
PHYSICIAN: Chad Tapia MD     SERVICE DATE:  EXAM TYPE: CAT 
- CT ABD & PELVIS W ORAL & IV CO EXAMINATION: CT ABDOMEN AND PELVIS WITH 
CONTRAST CLINICAL INFORMATION: Sudden onset periumbilical pain COMPARISON: None 
TECHNIQUE: Multidetector volumetric imaging was performed of the abdomen and 
pelvis following IV administration of 95 mL of Optiray 320 intravenous contrast.
Sagittal and coronal reformatted images were obtained on the technologist's 
workstation. DLP: 384.54 mGy-cm FINDINGS: LUNG BASES: There is mild dependent 
atelectasis at the lung bases. LIVER, GALLBLADDER, AND BILIARY TREE: The liver 
is normal in size, shape, and attenuation. There is a redemonstrated 
hypoattenuating lesion in the left hepatic lobe measuring approximately 2 cm, 
similar to prior. No biliary ductal dilatation is present. Patient is status 
post cholecystectomy. PANCREAS: Unremarkable. SPLEEN: Unremarkable. ADRENAL 
GLANDS: Unremarkable. KIDNEYS AND URETERS: No hydronephrosis bilaterally. The 
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 There are small hypoattenuating 
lesions in bilateral kidneys favoring cysts. There is a right lower pole renal 
calculus measuring 0.7 cm in length. BLADDER: Unremarkable. GASTROINTESTINAL 
TRACT: Patient is status post gastric bypass surgery. No specific evidence for 
bowel obstruction. The cecum has a different orientation compared to 2017 
with the base of the cecum now directed towards the midline, suggesting a mobile
cecum. There is prominent distention of the cecum and ascending colon with 
gradual decrease in colonic caliber extending to the splenic flecture; the 
remainder of the colon is mostly collapsed. There is colonic diverticulosis 
without convincing evidence for diverticulitis. The appendix is not well 
delineated. There is diffuse fat stranding throughout the mesentery in keeping 
with edema. A small volume of ascites is noted in the right abdomen. ABDOMINAL 
WALL: No significant hernia is appreciated. LYMPH NODES: Normal. VASCULAR: There
is mild calcification along the infrarenal aorta. PELVIC VISCERA: Unremarkable. 
There is a moderate amount of pelvic free fluid. OSSEOUS STRUCTURES: Mild 
scattered degenerative changes are present in the spine. IMPRESSION: 1.  
Configuration of the cecum has changed since 2017, with the base of the 
cecum now directed towards the midline suggesting a mobile cecum. No convincing 
evidence for obstruction. There is prominent gaseous distention of the cecum and
ascending colon with gradual decrease in caliber of the remainder of the colon. 
2.  Status post gastric bypass surgery. 3.  Diffuse mesenteric edema and small 
volume ascites. 4.  Colonic diverticulosis. DICTATED BY: Sherif Manley MD  DATE
/TIME DICTATED:18 :RAD.WATERS  DATE/TIME TRANSCRIBED:
18 CONFIDENTIAL, DO NOT COPY WITHOUT APPROPRIATE AUTHORIZATION.  <
Electronically signed in Other Vendor System>                                   
                                                    SIGNED BY: Sherif Manley MD
18 0427
Initial ED EKG: NSR, no ST T wave changes
Rhythm Strip: normal sinus rhythm
 
Departure
 
Departure
Disposition: STILL A PATIENT
Condition: Stable
Clinical Impression
Primary Impression: Upper abdominal pain, unspecified
Referrals:
Iraj LOPEZ,Chad CALVO (PCP/Family)
 
Departure Forms:
Customer Survey
General Discharge Information
Prescriptions:
Current Visit Scripts
No Known Home Medications     
 
 
Admission Note
Spoke With:
Maulik Sung DO
Documentation of Exam:
Documentation of any treatments & extenuating circumstances including Concerns 
Regarding Discharge (functional status, medication knowledge or non-compliance, 
living conditions, etc.) that warrant an admission rather than observation: [NPO
, IV FLUIDS, IV PAIN CONTROL, MAY REQUIRE SURGICAL INTERVENTION
 
OR/GI Note
Spoke With:
aMulik Sung DO
ED Treatment Decision:
ROMINA SLAUGHTER requires urgent operative management or an emergent procedure 
that cannot be performed in the Emergency Room setting.
 
Transport To: Surgical Suite
 
Critical Care Note
 
Critical Care Note
Critical Care Time: mins: (90 MIN)

## 2018-03-14 VITALS — DIASTOLIC BLOOD PRESSURE: 75 MMHG | SYSTOLIC BLOOD PRESSURE: 143 MMHG

## 2018-03-14 NOTE — SURG SHORT-STAY <48HRS DIS SUM
Visit Information
 
Visit Dates
Admission Date:
03/13/18
 
Discharge Date:
3/14/18
 
Surgical Short Stay DC Summary
Admission Diagnosis:
Abdominal pain, internal hernia
Final Diagnosis:
same as above, s/p
 
Laparoscopic reduction with subsequent repair of an internal hernia
 
Procedure(s):
Surgery Date: 03/13/18
Name of Procedure:
Laparoscopic reduction with subsequent repair of an internal hernia
Summary/Significant Findings:
Presented 3/13/18 with abdominal pain and suspected internal hernia. Taken to 
the OR by  on the same day for laparoscopic reduction with subsequent 
repair of an internal hernia. Started on clears post-op, with diet advancement 
as tolerated. Pain control transitioned from iv to oral medication. Discharged 
to home once tolerating diet advancement, and pain controlled with percocet.
Condition at Discharge:
stable
Discharge Disposition: home or self care
Discharge instructions provided to patient/family: Yes
Post discharge follow-up plan:
2 weeks with 
Copies to:
Iraj LOPEZ,Chad CALVO

## 2018-03-14 NOTE — PATIENT DISCHARGE INSTRUCTIONS
Discharge Instructions
 
General Discharge Information
You were seen/treated for:
internal hernia
You had these procedures:
Surgery Date: 03/13/18
Name of Procedure:
Laparoscopic reduction with subsequent repair of an internal hernia
Watch for these problems:
fever>101.3, increased pain, redness/swellling/drainage, dizziness, shortness of
breath, chest pain
No bath, but you may shower: Yes
Other wound care:
ok to remove outer dressings. leave white steri strips in place. keep incisions 
clean & dry.
 
Diet
Continue normal diet: Yes
Recommended Diet: Regular
 
Activity
Full Activity/No Limits: No
Activity Self Limited: Yes
Pounds, do NOT lift more than: 10
Other activity limits:
no heavy lifting. no strenuous activity.
 
Acute Coronary Syndrome
 
Inclusion Criteria
At DC or during hospital stay patient has or had the following:
ACS DIAGNOSIS No
 
Discharge Core Measures
Meds if any: Prescribed or Continued at Discharge
Meds if any: NOT Prescribed or Continued at Discharge
 
Congestive Heart Failure
 
Inclusion Criteria
At DC or during hospital stay patient has or had the following:
CHF DIAGNOSIS No
 
Discharge Core Measures
Meds if any: Prescribed or Continued at Discharge
Meds if any: NOT Prescribed or Continued at Discharge
 
Cerebrovascular accident
 
Inclusion Criteria
At DC or during hospital stay patient has or had the following:
CVA/TIA Diagnosis No
 
Discharge Core Measures
Meds if any: Prescribed or Continued at Discharge
Meds if any: NOT Prescribed or Continued at Discharge
 
Venous thromboembolism
 
Inclusion Criteria
VTE Diagnosis No
VTE Type NONE
VTE Confirmed by (Test) NONE
 
Discharge Core Measures
- Per Current guidelines, there needs to be overlap
- treatment for the first 5 days of Warfarin therapy.
- If discharged on Warfarin prior to 5 days of
- overlap therapy, the patient will need to be
- assessed for post discharge needs including
- *Post discharge parental anticoagulation
- *Warfarin and/or parental anticoagulation education
- *Follow up date to check INR post discharge
At least 5 days overlap therapy as Inpatient No
Meds if any: Prescribed or Continued at Discharge
Note: Overlap Therapy is Warfarin and Anticoagulant
Meds if any: NOT Prescribed or Continued at Discharge

## 2018-03-14 NOTE — PN- BARIATRICS
**See Addendum**
Subjective
Subjective:
Tolerating clears. She reports pain not much better after percocet, but she is 
willing to try this again today. Passing flatus and +bms after dulcolax. She 
denies nausea. Out of bed to bathroom. No dizziness. No shortness of breath. No 
chest pains.
 
Objective
Vital Signs and I&Os
Vital Signs
 
 
Date Time Temp Pulse Resp B/P B/P Pulse O2 O2 Flow FiO2
 
     Mean Ox Delivery Rate 
 
03/14 0354 98.0 64 20 143/75  97 Room Air  
 
03/13 2348 98.3 72 20 124/53  95 Room Air  
 
03/13 2114 98.7 72 18 135/71  96   
 
03/13 1826 99.0 73 18 133/76  96   
 
03/13 1426 98.7 81 18 162/74  97 Room Air Room Air 
 
 
 Intake & Output
 
 
 03/14 0800 03/14 0000 03/13 1600 03/13 0800 03/13 0000 03/12 1600
 
Intake Total 720 345    
 
Output Total      
 
Balance 720 345    
 
       
 
Intake,  225    
 
Intake, Oral 120 120    
 
Patient 178 lb  175 lb 175 lb  
 
Weight      
 
Weight   Reported by Patient Reported by Patient  
 
Measurement      
 
Method      
 
 
 
Physical Exam:
General - alert & oriented x 3. comfortable. no acute distress.
Lungs - clear bilaterally. no w/r/r.
Cardiac - s1s2. reg.
Abdomen - soft. dressings c/d/i. expected barbra-incisional tenderness. no drains.
Extremities - warm bilaterally. no c/c/e. calves soft and nontender b/l.
Current Medications:
 Current Medications
 
 
  Sig/Bridgett Start time  Last
 
Medication Dose Route Stop Time Status Admin
 
Acetaminophen 650 MG Q6P PRN 03/13 1015 AC 
 
  PO   
 
Bisacodyl 10 MG 1200 03/13 1200 DC 03/13
 
  WA 03/13 1201  1631
 
Cefazolin Sodium 2 GM Q8H 03/14 0330 DC 03/14
 
N/A 1 UNIT IV 03/14 0359  0349
 
Cefazolin Sodium 2 GM IQ8 03/13 1600 DC 03/13
 
N/A 1 UNIT IV 03/14 0029  1936
 
Dextrose/Sodium  1,000 ML .[id number] 03/13 1015 DC 03/14
 
Chloride  IV   0135
 
Heparin Sodium  5,000 UNIT Q8 03/13 1400 AC 03/14
 
(Porcine)  SC   0558
 
Metoclopramide HCl 10 MG Q6P PRN 03/13 1015 AC 
 
  IV   
 
Morphine Sulfate 4 MG Q2P PRN 03/13 1015 AC 03/14
 
  IV   0349
 
Oxycodone/ 1 TAB Q4P PRN 03/13 1015 AC 
 
Acetaminophen  PO   
 
Oxycodone/ 2 TAB Q4P PRN 03/13 1015 AC 03/13
 
Acetaminophen  PO   1629
 
Pantoprazole Sodium 40 MG DAILY 03/13 1200 AC 03/13
 
  IV   1631
 
 
 
 
Results
Last 48 Hours of Labs:
 Laboratory Tests
 
 
 03/14 03/13 03/13
 
 0724 0603 0518
 
Chemistry   
 
  Sodium Pending  
 
  Potassium Pending  
 
  Chloride Pending  
 
  Carbon Dioxide Pending  
 
  Anion Gap Pending  
 
  BUN Pending  
 
  Creatinine Pending  
 
  BUN/Creatinine Ratio Pending  
 
Coagulation   
 
  PT (9.4 - 12.5 SEC)  9.9 
 
  INR (0.90 - 1.19)  0.91 
 
  APTT (25 - 37 SEC)  33 
 
Hematology   
 
  CBC w Diff Pending  
 
  WBC Pending  
 
  RBC Pending  
 
  Hgb Pending  
 
  Hct Pending  
 
  MCV Pending  
 
  MCH Pending  
 
  MCHC Pending  
 
  RDW Pending  
 
  Plt Count Pending  
 
  MPV Pending  
 
Urines   
 
  Urine Color (YEL,AMB,STR)   YEL
 
  Urine Clarity (CLEAR)   CLEAR
 
  Urine pH (5.0 - 8.0)   6.0
 
  Ur Specific Gravity (1.001 - 1.035)   1.015
 
  Urine Protein (NEG,<30 MG/DL)   NEG
 
  Urine Ketones (NEG)   TRACE  H
 
  Urine Nitrite (NEG)   NEG
 
  Urine Bilirubin (NEG)   NEG
 
  Urine Urobilinogen (0.1  -  1.0 EU/dl)   0.2
 
  Ur Leukocyte Esterase (NEG)   NEG
 
  Ur Microscopic   EXAM NOT REQUIRED
 
  Urine Hemoglobin (NEG)   NEG
 
  Urine Glucose (N MG/DL)   100  H
 
 
 
 
 03/13 03/13
 
 0235 0151
 
Chemistry  
 
  Sodium (137 - 145 mmol/L) 146  H 
 
  Potassium (3.5 - 5.1 mmol/L) 3.8 
 
  Chloride (98 - 107 mmol/L) 113  H 
 
  Carbon Dioxide (22 - 30 mmol/L) 17  L 
 
  Anion Gap (5 - 16) 16 
 
  BUN (7 - 17 mg/dL) 14 
 
  Creatinine (0.5 - 1.0 mg/dL) 0.5 
 
  Estimated GFR (>60 ml/min) > 60 
 
  BUN/Creatinine Ratio (7 - 25 %) 28.0  H 
 
  Glucose (65 - 99 mg/dL) 108  H 
 
  Lactic Acid (0.7 - 2.1 mmol/L) 3.2  H 
 
  Calcium (8.4 - 10.2 mg/dL) 8.5 
 
  Total Bilirubin (0.2 - 1.3 mg/dL) 0.1  L 
 
  AST (14 - 36 U/L) 16 
 
  ALT (9 - 52 U/L) 16 
 
  Alkaline Phosphatase (<127 U/L) 59 
 
  Total Protein (6.3 - 8.2 g/dL) 6.5 
 
  Albumin (3.5 - 5.0 g/dL) 3.7 
 
  Globulin (1.9 - 4.2 gm/dL) 2.8 
 
  Albumin/Globulin Ratio (1.1 - 2.2 %) 1.3 
 
  Amylase (30 - 110 U/L) 48 
 
  Lipase (23 - 300 U/L) 116 
 
  Total Beta HCG (NEGATIVE) NEGATIVE 
 
Coagulation  
 
  PT  Cancelled
 
  INR  Cancelled
 
  APTT  Cancelled
 
Hematology  
 
  CBC w Diff  MAN DIFF ORDERED
 
  WBC (4.8 - 10.8 /CUMM)  7.3
 
  RBC (4.20 - 5.40 /CUMM)  4.64
 
  Hgb (12.0 - 16.0 G/DL)  14.6
 
  Hct (37 - 47 %)  42.9
 
  MCV (81.0 - 99.0 FL)  92.4
 
  MCH (27.0 - 31.0 PG)  31.4  H
 
  MCHC (33.0 - 37.0 G/DL)  33.9
 
  RDW (11.5 - 14.5 %)  14.1
 
  Plt Count (130 - 400 /CUMM)  198
 
  MPV (7.4 - 10.4 FL)  9.4
 
  Gran % (42.2 - 75.2 %)  63.4
 
  Lymphocytes % (20.5 - 51.1 %)  30.1
 
  Monocytes % (1.7 - 9.3 %)  4.5
 
  Eosinophils % (0 - 5 %)  1.0
 
  Basophils % (0.0 - 2.0 %)  1.0
 
  Absolute Granulocytes (1.4 - 6.5 /CUMM)  4.6
 
  Segmented Neutrophils (42.2 - 75.2 %)  54
 
  Band Neutrophils (0.0 - 5.0 %)  4
 
  Absolute Lymphocytes (1.2 - 3.4 /CUMM)  2.2
 
  Lymphocytes (20.5 - 51.1 %)  29
 
  Monocytes (1.7 - 9.3 %)  5
 
  Absolute Monocytes (0.10 - 0.60 /CUMM)  0.3
 
  Eosinophils (0 - 5.0 %)  5
 
  Absolute Eosinophils (0.0 - 0.7 /CUMM)  0.1
 
  Basophils (0.0 - 2.0 %)  3  H
 
  Absolute Basophils (0.0 - 0.2 /CUMM)  0.1
 
  Platelet Estimate (ADEQUATE)  ADEQUATE
 
  Normocytic RBCs  VERIFIED
 
  Normochromic RBCs  VERIFIED
 
 
 
 
Assessment/Plan
Assessment/Plan
This 47 year old female with hx gastric bypass, now POD#1 s/p laparoscopic 
reduction with subsequent repair of an internal hernia
 
tolerating clears. d/c iv fluids. advance to fulls
percocet / toradol prn pain control
oob/ambulation encouraged
hep sc - dvt ppx
f/u labs
d/c planning, possibly home later today if tolerating diet advancement and pain 
controlled
will d/w 
 
 
Core Measures
 
Venous Thromboembolism
VTE Risk Factors Age>40
No Mechanical VTE Prophylaxis d/t N/A MechProphylax Ordered
No VTE Pharm Prophylaxis d/t NA PharmProphylax ordered